# Patient Record
Sex: FEMALE | Race: WHITE | NOT HISPANIC OR LATINO | Employment: FULL TIME | URBAN - METROPOLITAN AREA
[De-identification: names, ages, dates, MRNs, and addresses within clinical notes are randomized per-mention and may not be internally consistent; named-entity substitution may affect disease eponyms.]

---

## 2022-10-18 ENCOUNTER — TELEPHONE (OUTPATIENT)
Dept: ORTHOPEDICS | Facility: CLINIC | Age: 47
End: 2022-10-18
Payer: COMMERCIAL

## 2022-10-18 NOTE — TELEPHONE ENCOUNTER
----- Message from Concha Aaron sent at 10/18/2022  3:38 PM CDT -----  Contact: patient  Type:  Patient Call          Who Called:patient         Does the patient know what this is regarding?: requesting a call back to schedule appt  ; pt was referred by Dr Gonsalez ;please advise           Would the patient rather a call back or a response via [facility]? Call           Best Call Back Number:[phone]             Additional Information:

## 2022-10-19 DIAGNOSIS — M25.562 LEFT KNEE PAIN, UNSPECIFIED CHRONICITY: Primary | ICD-10-CM

## 2022-10-20 ENCOUNTER — OFFICE VISIT (OUTPATIENT)
Dept: ORTHOPEDICS | Facility: CLINIC | Age: 47
End: 2022-10-20
Payer: COMMERCIAL

## 2022-10-20 ENCOUNTER — HOSPITAL ENCOUNTER (OUTPATIENT)
Dept: RADIOLOGY | Facility: HOSPITAL | Age: 47
Discharge: HOME | End: 2022-10-20
Attending: NURSE PRACTITIONER
Payer: COMMERCIAL

## 2022-10-20 DIAGNOSIS — M17.12 PRIMARY OSTEOARTHRITIS OF LEFT KNEE: Primary | ICD-10-CM

## 2022-10-20 DIAGNOSIS — M25.562 LEFT KNEE PAIN, UNSPECIFIED CHRONICITY: ICD-10-CM

## 2022-10-20 PROCEDURE — 73560 X-RAY EXAM OF KNEE 1 OR 2: CPT | Mod: 26,RT,, | Performed by: RADIOLOGY

## 2022-10-20 PROCEDURE — 20610 DRAIN/INJ JOINT/BURSA W/O US: CPT | Mod: LT,S$GLB,, | Performed by: NURSE PRACTITIONER

## 2022-10-20 PROCEDURE — 73560 XR KNEE ORTHO LEFT: ICD-10-PCS | Mod: 26,RT,, | Performed by: RADIOLOGY

## 2022-10-20 PROCEDURE — 4010F PR ACE/ARB THEARPY RXD/TAKEN: ICD-10-PCS | Mod: CPTII,S$GLB,, | Performed by: NURSE PRACTITIONER

## 2022-10-20 PROCEDURE — 4010F ACE/ARB THERAPY RXD/TAKEN: CPT | Mod: CPTII,S$GLB,, | Performed by: NURSE PRACTITIONER

## 2022-10-20 PROCEDURE — 73560 X-RAY EXAM OF KNEE 1 OR 2: CPT | Mod: TC,PO,RT

## 2022-10-20 PROCEDURE — 99999 PR PBB SHADOW E&M-EST. PATIENT-LVL III: CPT | Mod: PBBFAC,,, | Performed by: NURSE PRACTITIONER

## 2022-10-20 PROCEDURE — 73562 X-RAY EXAM OF KNEE 3: CPT | Mod: 26,LT,, | Performed by: RADIOLOGY

## 2022-10-20 PROCEDURE — 1160F PR REVIEW ALL MEDS BY PRESCRIBER/CLIN PHARMACIST DOCUMENTED: ICD-10-PCS | Mod: CPTII,S$GLB,, | Performed by: NURSE PRACTITIONER

## 2022-10-20 PROCEDURE — 20610 LARGE JOINT ASPIRATION/INJECTION: L KNEE: ICD-10-PCS | Mod: LT,S$GLB,, | Performed by: NURSE PRACTITIONER

## 2022-10-20 PROCEDURE — 99203 OFFICE O/P NEW LOW 30 MIN: CPT | Mod: 25,S$GLB,, | Performed by: NURSE PRACTITIONER

## 2022-10-20 PROCEDURE — 99203 PR OFFICE/OUTPT VISIT, NEW, LEVL III, 30-44 MIN: ICD-10-PCS | Mod: 25,S$GLB,, | Performed by: NURSE PRACTITIONER

## 2022-10-20 PROCEDURE — 1159F MED LIST DOCD IN RCRD: CPT | Mod: CPTII,S$GLB,, | Performed by: NURSE PRACTITIONER

## 2022-10-20 PROCEDURE — 1160F RVW MEDS BY RX/DR IN RCRD: CPT | Mod: CPTII,S$GLB,, | Performed by: NURSE PRACTITIONER

## 2022-10-20 PROCEDURE — 1159F PR MEDICATION LIST DOCUMENTED IN MEDICAL RECORD: ICD-10-PCS | Mod: CPTII,S$GLB,, | Performed by: NURSE PRACTITIONER

## 2022-10-20 PROCEDURE — 99999 PR PBB SHADOW E&M-EST. PATIENT-LVL III: ICD-10-PCS | Mod: PBBFAC,,, | Performed by: NURSE PRACTITIONER

## 2022-10-20 PROCEDURE — 73562 XR KNEE ORTHO LEFT: ICD-10-PCS | Mod: 26,LT,, | Performed by: RADIOLOGY

## 2022-10-20 RX ORDER — MONTELUKAST SODIUM 10 MG/1
10 TABLET ORAL DAILY
COMMUNITY
Start: 2022-10-20

## 2022-10-20 RX ORDER — PREDNISONE 20 MG/1
20 TABLET ORAL 2 TIMES DAILY
COMMUNITY
Start: 2022-10-18 | End: 2023-03-17

## 2022-10-20 RX ORDER — CYANOCOBALAMIN 1000 UG/ML
INJECTION, SOLUTION INTRAMUSCULAR; SUBCUTANEOUS
COMMUNITY
Start: 2022-08-26

## 2022-10-20 RX ORDER — BACLOFEN 10 MG/1
10 TABLET ORAL 2 TIMES DAILY
COMMUNITY
Start: 2022-09-30

## 2022-10-20 RX ORDER — METOPROLOL SUCCINATE 25 MG/1
25 TABLET, EXTENDED RELEASE ORAL 2 TIMES DAILY
COMMUNITY
Start: 2022-10-18

## 2022-10-20 RX ORDER — KETOCONAZOLE 20 MG/G
CREAM TOPICAL
COMMUNITY

## 2022-10-20 RX ORDER — DICLOFENAC SODIUM 75 MG/1
75 TABLET, DELAYED RELEASE ORAL 2 TIMES DAILY
COMMUNITY
Start: 2022-09-30

## 2022-10-20 RX ORDER — ACETAZOLAMIDE 250 MG/1
1 TABLET ORAL 2 TIMES DAILY
COMMUNITY
Start: 2022-10-20

## 2022-10-20 RX ORDER — ERGOCALCIFEROL 1.25 MG/1
2000 CAPSULE ORAL
COMMUNITY
End: 2023-03-17

## 2022-10-20 RX ORDER — POTASSIUM CHLORIDE 750 MG/1
10 TABLET, EXTENDED RELEASE ORAL DAILY
COMMUNITY
Start: 2022-09-30

## 2022-10-20 RX ORDER — FOLIC ACID 1 MG/1
1000 TABLET ORAL DAILY
COMMUNITY
Start: 2022-09-30

## 2022-10-20 RX ORDER — ACETAZOLAMIDE 500 MG/1
1000 CAPSULE, EXTENDED RELEASE ORAL
COMMUNITY
Start: 2022-03-22

## 2022-10-20 RX ORDER — ASPIRIN 325 MG
50000 TABLET, DELAYED RELEASE (ENTERIC COATED) ORAL
COMMUNITY
Start: 2022-09-30

## 2022-10-20 RX ORDER — PANTOPRAZOLE SODIUM 40 MG/1
40 TABLET, DELAYED RELEASE ORAL 2 TIMES DAILY
COMMUNITY

## 2022-10-20 RX ORDER — PRAMIPEXOLE DIHYDROCHLORIDE 0.12 MG/1
TABLET ORAL
COMMUNITY
Start: 2022-09-30

## 2022-10-20 RX ORDER — FUROSEMIDE 40 MG/1
40 TABLET ORAL DAILY
COMMUNITY
Start: 2022-10-20

## 2022-10-20 RX ORDER — HYDROCODONE BITARTRATE AND ACETAMINOPHEN 10; 325 MG/1; MG/1
1 TABLET ORAL 4 TIMES DAILY PRN
COMMUNITY
Start: 2022-10-04

## 2022-10-20 RX ORDER — PREGABALIN 75 MG/1
75 CAPSULE ORAL 3 TIMES DAILY
COMMUNITY
Start: 2022-09-30

## 2022-10-20 RX ORDER — DICLOFENAC SODIUM 50 MG/1
50 TABLET, DELAYED RELEASE ORAL 3 TIMES DAILY
COMMUNITY
Start: 2022-10-18 | End: 2023-03-17

## 2022-10-20 RX ADMIN — TRIAMCINOLONE ACETONIDE 40 MG: 40 INJECTION, SUSPENSION INTRA-ARTICULAR; INTRAMUSCULAR at 03:10

## 2022-10-20 NOTE — PROGRESS NOTES
Chief Complaint   Patient presents with    Left Knee - Pain     Hurting last 2 weeks. Monday night 10/17 felt pop in knee.          HPI:   This is a 46 y.o. female who presents to clinic today complaining of left knee pain for   2 weeks after twisting injury while pivoting. She went to ED, states ED physician said it may be her ligament instead of meniscus. Pain has improved some with elevating and icing. No numbness or tingling. She has hx of meniscal tear in her right knee and she had arthroscopy to that knee in 2010. She works at [employer] and has to work tonight and needs something to manage pain.     History reviewed. No pertinent past medical history.  History reviewed. No pertinent surgical history.  Current Outpatient Medications on File Prior to Visit   Medication Sig Dispense Refill    acetaZOLAMIDE (DIAMOX) 250 MG tablet Take 1 tablet by mouth 2 (two) times daily.      acetaZOLAMIDE (DIAMOX) 500 mg CpSR Take 1,000 mg by mouth.      baclofen (LIORESAL) 10 MG tablet Take 10 mg by mouth 2 (two) times daily.      cholecalciferol, vitamin D3, 1,250 mcg (50,000 unit) capsule Take 50,000 Units by mouth every 7 days.      cyanocobalamin 1,000 mcg/mL injection Inject into the muscle.      diclofenac (VOLTAREN) 50 MG EC tablet Take 50 mg by mouth 3 (three) times daily.      diclofenac (VOLTAREN) 75 MG EC tablet Take 75 mg by mouth 2 (two) times daily.      ergocalciferol (ERGOCALCIFEROL) 50,000 unit Cap Take 2,000 Units by mouth.      folic acid (FOLVITE) 1 MG tablet Take 1,000 mcg by mouth once daily.      furosemide (LASIX) 40 MG tablet Take 40 mg by mouth once daily.      HYDROcodone-acetaminophen (NORCO)  mg per tablet Take 1 tablet by mouth 4 (four) times daily as needed.      ketoconazole (NIZORAL) 2 % cream Apply topically as needed.      metoprolol succinate (TOPROL-XL) 25 MG 24 hr tablet Take 25 mg by mouth 2 (two) times daily.      montelukast (SINGULAIR) 10 mg tablet Take 10 mg by mouth once daily.       pantoprazole (PROTONIX) 40 MG tablet Take 40 mg by mouth 2 (two) times daily.      potassium chloride (KLOR-CON) 10 MEQ TbSR Take 10 mEq by mouth once daily.      pramipexole (MIRAPEX) 0.125 MG tablet Take by mouth.      predniSONE (DELTASONE) 20 MG tablet Take 20 mg by mouth 2 (two) times daily.      pregabalin (LYRICA) 75 MG capsule Take 75 mg by mouth 3 (three) times daily.       No current facility-administered medications on file prior to visit.     Review of patient's allergies indicates:  No Known Allergies  History reviewed. No pertinent family history.  Social History     Socioeconomic History    Marital status:    Tobacco Use    Smoking status: Never    Smokeless tobacco: Never       Review of Systems:  Constitutional:  Denies fever or chills   Eyes:  Denies change in visual acuity   HENT:  Denies nasal congestion or sore throat   Respiratory:  Denies cough or shortness of breath   Cardiovascular:  Denies chest pain or edema   GI:  Denies abdominal pain, nausea, vomiting, bloody stools or diarrhea   :  Denies dysuria   Integument:  Denies rash   Neurologic:  Denies headache, focal weakness or sensory changes   Endocrine:  Denies polyuria or polydipsia   Lymphatic:  Denies swollen glands   Psychiatric:  Denies depression or anxiety     Physical Exam:   Constitutional:  Well developed, well nourished, no acute distress, non-toxic appearance   Integument:  Well hydrated  Neurologic:  Alert & oriented x 3  Psychiatric:  Speech and behavior appropriate     Bilateral Knee Exam    Left Knee Exam     Tenderness   The patient is experiencing tenderness in the medial joint line.    Range of Motion   Extension: abnormal   Flexion: abnormal     Muscle Strength     The patient has normal knee strength.    Tests   Evelyn:  Medial - positive   Lachman:  Anterior - negative      Varus: negative  Valgus: negative  Patellar Apprehension: negative    Other   Erythema: absent  Sensation: normal  Pulse:  present  Swelling: mild      Right Knee Exam   Right knee exam performed same as contralateral side and is normal.            X-rays were performed, personally reviewed by me and findings discussed with the patient.  3 views of the left knee show tricompartmental degenerative change most pronounced in the medial compartment with Kellgren 3 changes          Primary osteoarthritis of left knee  -     Large Joint Aspiration/Injection: L knee      Using an aseptic technique, I injected 5 cc of lidocaine 1% without and 1 cc of kenalog 40mg into the left Knee. The patient tolerated this well.     RTC in 3 months or as needed.

## 2022-10-21 RX ORDER — TRIAMCINOLONE ACETONIDE 40 MG/ML
40 INJECTION, SUSPENSION INTRA-ARTICULAR; INTRAMUSCULAR
Status: DISCONTINUED | OUTPATIENT
Start: 2022-10-20 | End: 2022-10-21 | Stop reason: HOSPADM

## 2022-10-21 NOTE — PROCEDURES
Large Joint Aspiration/Injection: L knee    Date/Time: 10/20/2022 3:00 PM  Performed by: RUSSELL Stanley  Authorized by: RUSSELL Stanley     Consent Done?:  Yes (Verbal)  Indications:  Pain  Timeout: prior to procedure the correct patient, procedure, and site was verified    Prep: patient was prepped and draped in usual sterile fashion    Local anesthetic:  Lidocaine 1% without epinephrine  Anesthetic total (ml):  5      Details:  Needle Size:  21 G  Approach:  Anterolateral  Location:  Knee  Site:  L knee  Medications:  40 mg triamcinolone acetonide 40 mg/mL  Patient tolerance:  Patient tolerated the procedure well with no immediate complications

## 2023-03-17 ENCOUNTER — OFFICE VISIT (OUTPATIENT)
Dept: ORTHOPEDICS | Facility: CLINIC | Age: 48
End: 2023-03-17
Payer: COMMERCIAL

## 2023-03-17 VITALS — WEIGHT: 293 LBS | HEIGHT: 66 IN | BODY MASS INDEX: 47.09 KG/M2

## 2023-03-17 DIAGNOSIS — M17.12 PRIMARY OSTEOARTHRITIS OF LEFT KNEE: Primary | ICD-10-CM

## 2023-03-17 DIAGNOSIS — M17.11 PRIMARY OSTEOARTHRITIS OF RIGHT KNEE: ICD-10-CM

## 2023-03-17 PROCEDURE — 99999 PR PBB SHADOW E&M-EST. PATIENT-LVL IV: CPT | Mod: PBBFAC,,, | Performed by: NURSE PRACTITIONER

## 2023-03-17 PROCEDURE — 1159F MED LIST DOCD IN RCRD: CPT | Mod: CPTII,S$GLB,, | Performed by: NURSE PRACTITIONER

## 2023-03-17 PROCEDURE — 20610 DRAIN/INJ JOINT/BURSA W/O US: CPT | Mod: 50,S$GLB,, | Performed by: NURSE PRACTITIONER

## 2023-03-17 PROCEDURE — 3008F BODY MASS INDEX DOCD: CPT | Mod: CPTII,S$GLB,, | Performed by: NURSE PRACTITIONER

## 2023-03-17 PROCEDURE — 99499 UNLISTED E&M SERVICE: CPT | Mod: S$GLB,,, | Performed by: NURSE PRACTITIONER

## 2023-03-17 PROCEDURE — 99499 NO LOS: ICD-10-PCS | Mod: S$GLB,,, | Performed by: NURSE PRACTITIONER

## 2023-03-17 PROCEDURE — 1159F PR MEDICATION LIST DOCUMENTED IN MEDICAL RECORD: ICD-10-PCS | Mod: CPTII,S$GLB,, | Performed by: NURSE PRACTITIONER

## 2023-03-17 PROCEDURE — 1160F RVW MEDS BY RX/DR IN RCRD: CPT | Mod: CPTII,S$GLB,, | Performed by: NURSE PRACTITIONER

## 2023-03-17 PROCEDURE — 20610 LARGE JOINT ASPIRATION/INJECTION: BILATERAL KNEE: ICD-10-PCS | Mod: 50,S$GLB,, | Performed by: NURSE PRACTITIONER

## 2023-03-17 PROCEDURE — 4010F ACE/ARB THERAPY RXD/TAKEN: CPT | Mod: CPTII,S$GLB,, | Performed by: NURSE PRACTITIONER

## 2023-03-17 PROCEDURE — 99999 PR PBB SHADOW E&M-EST. PATIENT-LVL IV: ICD-10-PCS | Mod: PBBFAC,,, | Performed by: NURSE PRACTITIONER

## 2023-03-17 PROCEDURE — 4010F PR ACE/ARB THEARPY RXD/TAKEN: ICD-10-PCS | Mod: CPTII,S$GLB,, | Performed by: NURSE PRACTITIONER

## 2023-03-17 PROCEDURE — 1160F PR REVIEW ALL MEDS BY PRESCRIBER/CLIN PHARMACIST DOCUMENTED: ICD-10-PCS | Mod: CPTII,S$GLB,, | Performed by: NURSE PRACTITIONER

## 2023-03-17 PROCEDURE — 3008F PR BODY MASS INDEX (BMI) DOCUMENTED: ICD-10-PCS | Mod: CPTII,S$GLB,, | Performed by: NURSE PRACTITIONER

## 2023-03-17 RX ORDER — FERROUS SULFATE 324(65)MG
324 TABLET, DELAYED RELEASE (ENTERIC COATED) ORAL
COMMUNITY

## 2023-03-17 RX ORDER — TIRZEPATIDE 10 MG/.5ML
10 INJECTION, SOLUTION SUBCUTANEOUS
COMMUNITY

## 2023-03-17 RX ORDER — LOSARTAN POTASSIUM 100 MG/1
100 TABLET ORAL
COMMUNITY
Start: 2023-03-07

## 2023-03-17 RX ORDER — TRIAMCINOLONE ACETONIDE 40 MG/ML
40 INJECTION, SUSPENSION INTRA-ARTICULAR; INTRAMUSCULAR
Status: DISCONTINUED | OUTPATIENT
Start: 2023-03-17 | End: 2023-03-17 | Stop reason: HOSPADM

## 2023-03-17 RX ORDER — BUDESONIDE AND FORMOTEROL FUMARATE DIHYDRATE 160; 4.5 UG/1; UG/1
AEROSOL RESPIRATORY (INHALATION)
COMMUNITY
Start: 2022-12-02

## 2023-03-17 RX ADMIN — TRIAMCINOLONE ACETONIDE 40 MG: 40 INJECTION, SUSPENSION INTRA-ARTICULAR; INTRAMUSCULAR at 08:03

## 2023-03-17 NOTE — PROCEDURES
Large Joint Aspiration/Injection: bilateral knee    Date/Time: 3/17/2023 8:20 AM  Performed by: RUSSELL Stanley  Authorized by: RUSSELL Stanley     Consent Done?:  Yes (Verbal)  Indications:  Pain  Timeout: prior to procedure the correct patient, procedure, and site was verified    Prep: patient was prepped and draped in usual sterile fashion    Local anesthetic:  Lidocaine 1% without epinephrine  Anesthetic total (ml):  5      Details:  Needle Size:  21 G  Approach:  Anterolateral  Location:  Knee  Laterality:  Bilateral  Site:  Bilateral knee  Medications (Right):  40 mg triamcinolone acetonide 40 mg/mL  Medications (Left):  40 mg triamcinolone acetonide 40 mg/mL  Patient tolerance:  Patient tolerated the procedure well with no immediate complications

## 2023-03-17 NOTE — PROGRESS NOTES
Chief Complaint   Patient presents with    Left Knee - Pain    Right Knee - Pain         HPI:   This is a 47 y.o. female who presents to clinic today complaining of bilateral knee pain for 2 weeks after no known trauma. Pain is progressively worsening. No numbness or tingling. No associated signs or symptoms.    History reviewed. No pertinent past medical history.  Past Surgical History:   Procedure Laterality Date    HYSTERECTOMY      knee scope Right      Current Outpatient Medications on File Prior to Visit   Medication Sig Dispense Refill    acetaZOLAMIDE (DIAMOX) 250 MG tablet Take 1 tablet by mouth 2 (two) times daily.      acetaZOLAMIDE (DIAMOX) 500 mg CpSR Take 1,000 mg by mouth.      baclofen (LIORESAL) 10 MG tablet Take 10 mg by mouth 2 (two) times daily.      cholecalciferol, vitamin D3, 1,250 mcg (50,000 unit) capsule Take 50,000 Units by mouth every 7 days.      cyanocobalamin 1,000 mcg/mL injection Inject into the muscle.      diclofenac (VOLTAREN) 75 MG EC tablet Take 75 mg by mouth 2 (two) times daily.      ferrous sulfate 324 mg (65 mg iron) TbEC Take 324 mg by mouth.      folic acid (FOLVITE) 1 MG tablet Take 1,000 mcg by mouth once daily.      furosemide (LASIX) 40 MG tablet Take 40 mg by mouth once daily.      HYDROcodone-acetaminophen (NORCO)  mg per tablet Take 1 tablet by mouth 4 (four) times daily as needed.      ketoconazole (NIZORAL) 2 % cream Apply topically as needed.      losartan (COZAAR) 100 MG tablet Take 100 mg by mouth.      metoprolol succinate (TOPROL-XL) 25 MG 24 hr tablet Take 25 mg by mouth 2 (two) times daily.      montelukast (SINGULAIR) 10 mg tablet Take 10 mg by mouth once daily.      pantoprazole (PROTONIX) 40 MG tablet Take 40 mg by mouth 2 (two) times daily.      potassium chloride (KLOR-CON) 10 MEQ TbSR Take 10 mEq by mouth once daily.      pramipexole (MIRAPEX) 0.125 MG tablet Take by mouth.      pregabalin (LYRICA) 75 MG capsule Take 75 mg by mouth 3 (three)  times daily.      SYMBICORT 160-4.5 mcg/actuation HFAA Inhale into the lungs.      tirzepatide (MOUNJARO) 10 mg/0.5 mL PnIj Inject 10 mg into the skin every 7 days.      [DISCONTINUED] diclofenac (VOLTAREN) 50 MG EC tablet Take 50 mg by mouth 3 (three) times daily.      [DISCONTINUED] ergocalciferol (ERGOCALCIFEROL) 50,000 unit Cap Take 2,000 Units by mouth.      [DISCONTINUED] predniSONE (DELTASONE) 20 MG tablet Take 20 mg by mouth 2 (two) times daily.       No current facility-administered medications on file prior to visit.     Review of patient's allergies indicates:   Allergen Reactions    Adhesive tape-silicones      Other reaction(s): Other (See Comments)  CANNOT USE PAPER TAPE OR FABRIC BANDAIDS    Codeine Rash     Only in liquid form like cough meds- per pt. Pt can tolerate hydrocodone and oxycodone       Egg Diarrhea    Lactase      Other reaction(s): Other (See Comments)  STOMACH ISSUES AND DIARRHEA AND SOMETIMES VOMITING     History reviewed. No pertinent family history.  Social History     Socioeconomic History    Marital status:    Tobacco Use    Smoking status: Never    Smokeless tobacco: Never   Substance and Sexual Activity    Alcohol use: Never    Drug use: Never       Review of Systems:  Constitutional:  Denies fever or chills   Eyes:  Denies change in visual acuity   HENT:  Denies nasal congestion or sore throat   Respiratory:  Denies cough or shortness of breath   Cardiovascular:  Denies chest pain or edema   GI:  Denies abdominal pain, nausea, vomiting, bloody stools or diarrhea   :  Denies dysuria   Integument:  Denies rash   Neurologic:  Denies headache, focal weakness or sensory changes   Endocrine:  Denies polyuria or polydipsia   Lymphatic:  Denies swollen glands   Psychiatric:  Denies depression or anxiety     Physical Exam:   Constitutional:  Well developed, well nourished, no acute distress, non-toxic appearance   Integument:  Well hydrated  Neurologic:  Alert & oriented x  3  Psychiatric:  Speech and behavior appropriate    Bilateral Knee Exam    Bilateral Knee Exam     Tenderness   The patient is experiencing tenderness in the medial joint line.    Range of Motion   Extension: abnormal   Flexion: abnormal     Muscle Strength     The patient has normal knee strength.    Tests   Evelyn:  Medial - positive   Lachman:  Anterior - negative      Varus: negative  Valgus: negative  Patellar Apprehension: negative    Other   Erythema: absent  Sensation: normal  Pulse: present  Swelling: mild      Bilateral Knee Exam   Bilateral knee exam performed same as contralateral side and is normal.              Primary osteoarthritis of left knee  -     Large Joint Aspiration/Injection: bilateral knee    Primary osteoarthritis of right knee  -     Large Joint Aspiration/Injection: bilateral knee          Using an aseptic technique, I injected 5 cc of lidocaine 1% without and 1 cc of kenalog 40mg into the bilateral Knee. The patient tolerated this well.     Rtc in 3 months.

## 2023-05-29 ENCOUNTER — TELEPHONE (OUTPATIENT)
Dept: ORTHOPEDICS | Facility: CLINIC | Age: 48
End: 2023-05-29
Payer: COMMERCIAL

## 2023-05-30 ENCOUNTER — TELEPHONE (OUTPATIENT)
Dept: ORTHOPEDICS | Facility: CLINIC | Age: 48
End: 2023-05-30
Payer: COMMERCIAL

## 2023-05-31 ENCOUNTER — TELEPHONE (OUTPATIENT)
Dept: ORTHOPEDICS | Facility: CLINIC | Age: 48
End: 2023-05-31
Payer: COMMERCIAL

## 2023-05-31 NOTE — TELEPHONE ENCOUNTER
3rd attempt, let pt know we were cxing her appt on 6/20/23 at 9:20am due to provider being out of office/, please call office for rescheduling, if needed/jf 5/31/23 *LM to reschedule/jf 5/30/23* *LM to reschedule/jf 5/29/23* bilateral knee 3MO

## 2023-10-03 ENCOUNTER — OFFICE VISIT (OUTPATIENT)
Dept: ORTHOPEDICS | Facility: CLINIC | Age: 48
End: 2023-10-03
Payer: COMMERCIAL

## 2023-10-03 DIAGNOSIS — M17.11 PRIMARY OSTEOARTHRITIS OF RIGHT KNEE: ICD-10-CM

## 2023-10-03 DIAGNOSIS — M17.12 PRIMARY OSTEOARTHRITIS OF LEFT KNEE: Primary | ICD-10-CM

## 2023-10-03 PROCEDURE — 99999 PR PBB SHADOW E&M-EST. PATIENT-LVL III: CPT | Mod: PBBFAC,,, | Performed by: NURSE PRACTITIONER

## 2023-10-03 PROCEDURE — 20610 LARGE JOINT ASPIRATION/INJECTION: BILATERAL KNEE: ICD-10-PCS | Mod: 50,S$GLB,, | Performed by: NURSE PRACTITIONER

## 2023-10-03 PROCEDURE — 3044F PR MOST RECENT HEMOGLOBIN A1C LEVEL <7.0%: ICD-10-PCS | Mod: CPTII,S$GLB,, | Performed by: NURSE PRACTITIONER

## 2023-10-03 PROCEDURE — 3044F HG A1C LEVEL LT 7.0%: CPT | Mod: CPTII,S$GLB,, | Performed by: NURSE PRACTITIONER

## 2023-10-03 PROCEDURE — 99212 PR OFFICE/OUTPT VISIT, EST, LEVL II, 10-19 MIN: ICD-10-PCS | Mod: 25,S$GLB,, | Performed by: NURSE PRACTITIONER

## 2023-10-03 PROCEDURE — 4010F PR ACE/ARB THEARPY RXD/TAKEN: ICD-10-PCS | Mod: CPTII,S$GLB,, | Performed by: NURSE PRACTITIONER

## 2023-10-03 PROCEDURE — 1159F MED LIST DOCD IN RCRD: CPT | Mod: CPTII,S$GLB,, | Performed by: NURSE PRACTITIONER

## 2023-10-03 PROCEDURE — 4010F ACE/ARB THERAPY RXD/TAKEN: CPT | Mod: CPTII,S$GLB,, | Performed by: NURSE PRACTITIONER

## 2023-10-03 PROCEDURE — 20610 DRAIN/INJ JOINT/BURSA W/O US: CPT | Mod: 50,S$GLB,, | Performed by: NURSE PRACTITIONER

## 2023-10-03 PROCEDURE — 1159F PR MEDICATION LIST DOCUMENTED IN MEDICAL RECORD: ICD-10-PCS | Mod: CPTII,S$GLB,, | Performed by: NURSE PRACTITIONER

## 2023-10-03 PROCEDURE — 99212 OFFICE O/P EST SF 10 MIN: CPT | Mod: 25,S$GLB,, | Performed by: NURSE PRACTITIONER

## 2023-10-03 PROCEDURE — 1160F PR REVIEW ALL MEDS BY PRESCRIBER/CLIN PHARMACIST DOCUMENTED: ICD-10-PCS | Mod: CPTII,S$GLB,, | Performed by: NURSE PRACTITIONER

## 2023-10-03 PROCEDURE — 99999 PR PBB SHADOW E&M-EST. PATIENT-LVL III: ICD-10-PCS | Mod: PBBFAC,,, | Performed by: NURSE PRACTITIONER

## 2023-10-03 PROCEDURE — 1160F RVW MEDS BY RX/DR IN RCRD: CPT | Mod: CPTII,S$GLB,, | Performed by: NURSE PRACTITIONER

## 2023-10-03 RX ADMIN — TRIAMCINOLONE ACETONIDE 40 MG: 40 INJECTION, SUSPENSION INTRA-ARTICULAR; INTRAMUSCULAR at 03:10

## 2023-10-03 NOTE — LETTER
October 3, 2023    Valery Akins  [address] [facility] [facility] [place] [address]         [facility]  [address] [place] [address]  Phone: [phone] October 3, 2023     Patient: Valery Akins   YOB: 1975   Date of Visit: 10/3/2023       To Whom It May Concern:    It is my medical opinion that Valery Akins should remain out of work until 10/10/2023 .    If you have any questions or concerns, please don't hesitate to call.    Sincerely,        Mia Escobar FNP

## 2023-10-04 RX ORDER — TRIAMCINOLONE ACETONIDE 40 MG/ML
40 INJECTION, SUSPENSION INTRA-ARTICULAR; INTRAMUSCULAR
Status: DISCONTINUED | OUTPATIENT
Start: 2023-10-03 | End: 2023-10-04 | Stop reason: HOSPADM

## 2023-10-04 NOTE — PROGRESS NOTES
Chief Complaint   Patient presents with    Right Knee - Pain    Left Knee - Pain         HPI:   This is a 47 y.o. who presents to clinic today complaining of bilateral knee pain for 2-3 weeks after no known trauma. 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 Pain is progressively worsening. No numbness or tingling. No associated signs or symptoms.    History reviewed. No pertinent past medical history.  Past Surgical History:   Procedure Laterality Date    HYSTERECTOMY      knee scope Right      Current Outpatient Medications on File Prior to Visit   Medication Sig Dispense Refill    acetaZOLAMIDE (DIAMOX) 250 MG tablet Take 1 tablet by mouth 2 (two) times daily.      acetaZOLAMIDE (DIAMOX) 500 mg CpSR Take 1,000 mg by mouth.      baclofen (LIORESAL) 10 MG tablet Take 10 mg by mouth 2 (two) times daily.      cholecalciferol, vitamin D3, 1,250 mcg (50,000 unit) capsule Take 50,000 Units by mouth every 7 days.      cyanocobalamin 1,000 mcg/mL injection Inject into the muscle.      diclofenac (VOLTAREN) 75 MG EC tablet Take 75 mg by mouth 2 (two) times daily.      ferrous sulfate 324 mg (65 mg iron) TbEC Take 324 mg by mouth.      folic acid (FOLVITE) 1 MG tablet Take 1,000 mcg by mouth once daily.      furosemide (LASIX) 40 MG tablet Take 40 mg by mouth once daily.      HYDROcodone-acetaminophen (NORCO)  mg per tablet Take 1 tablet by mouth 4 (four) times daily as needed.      ketoconazole (NIZORAL) 2 % cream Apply topically as needed.      losartan (COZAAR) 100 MG tablet Take 100 mg by mouth.      metoprolol succinate (TOPROL-XL) 25 MG 24 hr tablet Take 25 mg by mouth 2 (two) times daily.      montelukast (SINGULAIR) 10 mg tablet Take 10 mg by mouth once daily.      pantoprazole (PROTONIX) 40 MG tablet Take 40 mg by mouth 2 (two) times daily.      potassium chloride (KLOR-CON) 10 MEQ TbSR Take 10 mEq by mouth once daily.      pramipexole (MIRAPEX)  0.125 MG tablet Take by mouth.      pregabalin (LYRICA) 75 MG capsule Take 75 mg by mouth 3 (three) times daily.      SYMBICORT 160-4.5 mcg/actuation HFAA Inhale into the lungs.      tirzepatide (MOUNJARO) 10 mg/0.5 mL PnIj Inject 10 mg into the skin every 7 days.       No current facility-administered medications on file prior to visit.     Review of patient's allergies indicates:   Allergen Reactions    Adhesive tape-silicones      Other reaction(s): Other (See Comments)  CANNOT USE PAPER TAPE OR FABRIC BANDAIDS    Codeine Rash     Only in liquid form like cough meds- per pt. Pt can tolerate hydrocodone and oxycodone       Egg Diarrhea    Lactase      Other reaction(s): Other (See Comments)  STOMACH ISSUES AND DIARRHEA AND SOMETIMES VOMITING     History reviewed. No pertinent family history.  Social History     Socioeconomic History    Marital status:    Tobacco Use    Smoking status: Never    Smokeless tobacco: Never   Substance and Sexual Activity    Alcohol use: Never    Drug use: Never       Review of Systems:  Constitutional:  Denies fever or chills   Eyes:  Denies change in visual acuity   HENT:  Denies nasal congestion or sore throat   Respiratory:  Denies cough or shortness of breath   Cardiovascular:  Denies chest pain or edema   GI:  Denies abdominal pain, nausea, vomiting, bloody stools or diarrhea   :  Denies dysuria   Integument:  Denies rash   Neurologic:  Denies headache, focal weakness or sensory changes   Endocrine:  Denies polyuria or polydipsia   Lymphatic:  Denies swollen glands   Psychiatric:  Denies depression or anxiety     Physical Exam:   Constitutional:  Well developed, well nourished, no acute distress, non-toxic appearance   Integument:  Well hydrated, no rash   Lymphatic:  No lymphadenopathy noted   Neurologic:  Alert & oriented x 3, CN 2-12 normal, normal motor function, normal sensory function, no focal deficits noted   Psychiatric:  Speech and behavior appropriate   Eyes:  EOMI  Gi: abdomen soft    Bilateral Knee Exam    Bilateral Knee Exam     Tenderness   The patient is experiencing tenderness in the medial joint line.    Range of Motion   Extension: abnormal   Flexion: abnormal     Muscle Strength     The patient has normal knee strength.    Tests   Evelyn:  Medial - positive   Lachman:  Anterior - negative      Varus: negative  Valgus: negative  Patellar Apprehension: negative    Other   Erythema: absent  Sensation: normal  Pulse: present  Swelling: mild to moderate      Bilateral Knee Exam   Bilateral knee exam performed same as contralateral side and is normal.          Primary osteoarthritis of left knee  -     Large Joint Aspiration/Injection: bilateral knee  -     triamcinolone acetonide injection 40 mg  -     triamcinolone acetonide injection 40 mg    Primary osteoarthritis of right knee  -     Large Joint Aspiration/Injection: bilateral knee  -     triamcinolone acetonide injection 40 mg  -     triamcinolone acetonide injection 40 mg          Using an aseptic technique, I injected 5 cc of lidocaine 1% without and 1 cc of kenalog 40mg into the bilateral Knee. The patient tolerated this well.     Rtc as needed.

## 2023-10-04 NOTE — PROCEDURES
Large Joint Aspiration/Injection: bilateral knee    Date/Time: 10/3/2023 3:20 PM    Performed by: Mia Escobar FNP  Authorized by: Mia Escobar FNP    Consent Done?:  Yes (Verbal)  Indications:  Pain  Timeout: prior to procedure the correct patient, procedure, and site was verified    Prep: patient was prepped and draped in usual sterile fashion    Local anesthetic:  Lidocaine 1% without epinephrine  Anesthetic total (ml):  5      Details:  Needle Size:  21 G  Approach:  Anterolateral  Location:  Knee  Laterality:  Bilateral  Site:  Bilateral knee  Medications (Right):  40 mg triamcinolone acetonide 40 mg/mL  Medications (Left):  40 mg triamcinolone acetonide 40 mg/mL  Patient tolerance:  Patient tolerated the procedure well with no immediate complications

## 2023-10-06 ENCOUNTER — TELEPHONE (OUTPATIENT)
Dept: ORTHOPEDICS | Facility: CLINIC | Age: 48
End: 2023-10-06
Payer: COMMERCIAL

## 2023-10-06 DIAGNOSIS — M17.12 PRIMARY OSTEOARTHRITIS OF LEFT KNEE: Primary | ICD-10-CM

## 2023-10-06 DIAGNOSIS — M17.11 PRIMARY OSTEOARTHRITIS OF RIGHT KNEE: ICD-10-CM

## 2023-10-06 RX ORDER — METHOCARBAMOL 750 MG/1
750 TABLET, FILM COATED ORAL 3 TIMES DAILY PRN
Qty: 90 TABLET | Refills: 0 | Status: SHIPPED | OUTPATIENT
Start: 2023-10-06 | End: 2023-11-05

## 2023-10-06 RX ORDER — METHOCARBAMOL 750 MG/1
1500 TABLET, FILM COATED ORAL 3 TIMES DAILY PRN
Qty: 80 TABLET | Refills: 2 | Status: SHIPPED | OUTPATIENT
Start: 2023-10-06 | End: 2023-10-06

## 2023-10-06 NOTE — TELEPHONE ENCOUNTER
Pt states a muscle relaxer was supposed to be called in.   FYI from pt, she takes baclofen and diclofenac.

## 2023-10-06 NOTE — TELEPHONE ENCOUNTER
----- Message from Unruly Sanderson sent at 10/6/2023  2:59 PM CDT -----  Regarding: checking on Rx that was to be called it yesterday, call pt   Contact: pt   checking on Rx that was to be called it yesterday, call pt

## 2024-01-26 ENCOUNTER — OFFICE VISIT (OUTPATIENT)
Dept: ORTHOPEDICS | Facility: CLINIC | Age: 49
End: 2024-01-26
Payer: COMMERCIAL

## 2024-01-26 VITALS — BODY MASS INDEX: 47.09 KG/M2 | WEIGHT: 293 LBS | HEIGHT: 66 IN

## 2024-01-26 DIAGNOSIS — M17.0 BILATERAL PRIMARY OSTEOARTHRITIS OF KNEE: Primary | ICD-10-CM

## 2024-01-26 PROCEDURE — 1160F RVW MEDS BY RX/DR IN RCRD: CPT | Mod: CPTII,S$GLB,, | Performed by: NURSE PRACTITIONER

## 2024-01-26 PROCEDURE — 3008F BODY MASS INDEX DOCD: CPT | Mod: CPTII,S$GLB,, | Performed by: NURSE PRACTITIONER

## 2024-01-26 PROCEDURE — 99999 PR PBB SHADOW E&M-EST. PATIENT-LVL IV: CPT | Mod: PBBFAC,,, | Performed by: NURSE PRACTITIONER

## 2024-01-26 PROCEDURE — 20610 DRAIN/INJ JOINT/BURSA W/O US: CPT | Mod: 50,S$GLB,, | Performed by: NURSE PRACTITIONER

## 2024-01-26 PROCEDURE — 99214 OFFICE O/P EST MOD 30 MIN: CPT | Mod: 25,S$GLB,, | Performed by: NURSE PRACTITIONER

## 2024-01-26 PROCEDURE — 1159F MED LIST DOCD IN RCRD: CPT | Mod: CPTII,S$GLB,, | Performed by: NURSE PRACTITIONER

## 2024-01-26 RX ORDER — TRIAMCINOLONE ACETONIDE 40 MG/ML
40 INJECTION, SUSPENSION INTRA-ARTICULAR; INTRAMUSCULAR
Status: DISCONTINUED | OUTPATIENT
Start: 2024-01-26 | End: 2024-01-26 | Stop reason: HOSPADM

## 2024-01-26 RX ADMIN — TRIAMCINOLONE ACETONIDE 40 MG: 40 INJECTION, SUSPENSION INTRA-ARTICULAR; INTRAMUSCULAR at 11:01

## 2024-01-26 NOTE — PROGRESS NOTES
Chief Complaint   Patient presents with    Right Knee - Pain    Left Knee - Pain         HPI:   This is a 48 y.o. who presents to clinic today complaining of bilateral knee pain for 1 month after no known trauma. Pain is progressively worsening. No numbness or tingling. No associated signs or symptoms.    History reviewed. No pertinent past medical history.  Past Surgical History:   Procedure Laterality Date    HYSTERECTOMY      knee scope Right      Current Outpatient Medications on File Prior to Visit   Medication Sig Dispense Refill    acetaZOLAMIDE (DIAMOX) 250 MG tablet Take 1 tablet by mouth 2 (two) times daily.      acetaZOLAMIDE (DIAMOX) 500 mg CpSR Take 1,000 mg by mouth.      baclofen (LIORESAL) 10 MG tablet Take 10 mg by mouth 2 (two) times daily.      cholecalciferol, vitamin D3, 1,250 mcg (50,000 unit) capsule Take 50,000 Units by mouth every 7 days.      cyanocobalamin 1,000 mcg/mL injection Inject into the muscle.      diclofenac (VOLTAREN) 75 MG EC tablet Take 75 mg by mouth 2 (two) times daily.      ferrous sulfate 324 mg (65 mg iron) TbEC Take 324 mg by mouth.      folic acid (FOLVITE) 1 MG tablet Take 1,000 mcg by mouth once daily.      furosemide (LASIX) 40 MG tablet Take 40 mg by mouth once daily.      HYDROcodone-acetaminophen (NORCO)  mg per tablet Take 1 tablet by mouth 4 (four) times daily as needed.      ketoconazole (NIZORAL) 2 % cream Apply topically as needed.      losartan (COZAAR) 100 MG tablet Take 100 mg by mouth.      metoprolol succinate (TOPROL-XL) 25 MG 24 hr tablet Take 25 mg by mouth 2 (two) times daily.      montelukast (SINGULAIR) 10 mg tablet Take 10 mg by mouth once daily.      pantoprazole (PROTONIX) 40 MG tablet Take 40 mg by mouth 2 (two) times daily.      potassium chloride (KLOR-CON) 10 MEQ TbSR Take 10 mEq by mouth once daily.      pramipexole (MIRAPEX) 0.125 MG tablet Take by mouth.      pregabalin (LYRICA) 75 MG capsule Take 75 mg by mouth 3 (three) times  daily.      SYMBICORT 160-4.5 mcg/actuation HFAA Inhale into the lungs.      tirzepatide (MOUNJARO) 10 mg/0.5 mL PnIj Inject 10 mg into the skin every 7 days.       No current facility-administered medications on file prior to visit.     Review of patient's allergies indicates:   Allergen Reactions    Adhesive tape-silicones      Other reaction(s): Other (See Comments)  CANNOT USE PAPER TAPE OR FABRIC BANDAIDS    Codeine Rash     Only in liquid form like cough meds- per pt. Pt can tolerate hydrocodone and oxycodone       Egg Diarrhea    Lactase      Other reaction(s): Other (See Comments)  STOMACH ISSUES AND DIARRHEA AND SOMETIMES VOMITING     History reviewed. No pertinent family history.  Social History     Socioeconomic History    Marital status:    Tobacco Use    Smoking status: Never    Smokeless tobacco: Never   Substance and Sexual Activity    Alcohol use: Never    Drug use: Never       Review of Systems:  Constitutional:  Denies fever or chills   Eyes:  Denies change in visual acuity   HENT:  Denies nasal congestion or sore throat   Respiratory:  Denies cough or shortness of breath   Cardiovascular:  Denies chest pain or edema   GI:  Denies abdominal pain, nausea, vomiting, bloody stools or diarrhea   :  Denies dysuria   Integument:  Denies rash   Neurologic:  Denies headache, focal weakness or sensory changes   Endocrine:  Denies polyuria or polydipsia   Lymphatic:  Denies swollen glands   Psychiatric:  Denies depression or anxiety     Physical Exam:   Constitutional:  Well developed, well nourished, no acute distress, non-toxic appearance   Integument:  Well hydrated, no rash   Lymphatic:  No lymphadenopathy noted   Neurologic:  Alert & oriented x 3, CN 2-12 normal, normal motor function, normal sensory function, no focal deficits noted   Psychiatric:  Speech and behavior appropriate   Eyes: EOMI  Gi: abdomen soft    Bilateral Knee Exam    Bilateral Knee Exam     Tenderness   The patient is  experiencing tenderness in the medial joint line.    Range of Motion   Extension: abnormal   Flexion: abnormal     Muscle Strength     The patient has normal knee strength.    Tests   Evelyn:  Medial - positive   Lachman:  Anterior - negative      Varus: negative  Valgus: negative  Patellar Apprehension: negative    Other   Erythema: absent  Sensation: normal  Pulse: present  Swelling: mild              Assessment & plan:    Bilateral primary osteoarthritis of knee  -     Large Joint Aspiration/Injection: bilateral knee  -     triamcinolone acetonide injection 40 mg  -     triamcinolone acetonide injection 40 mg        Instructed the patient to rest and elevate the affected extremity. He/she can apply cold compresses intermittently as needed.  If warm compresses work better per the patient, can use warm and cool compresses intermittently. As pain improves, patient may then begin normal activities slowly as tolerated.  Call if symptoms persist despite doing instructed directions as above.      Using an aseptic technique, I injected 5 cc of lidocaine 1% without and 1 cc of kenalog 40mg into the bilateral knees. The patient tolerated this well. I will have them return to clinic in 3 months or as needed.    Instructed patient to avoid strenuous activities or activities that he/she is unable to perform because of knee pain for the first day of injection to prevent damage in the knee joint as lidocaine is used and pain will not be present due to numbing analgesic. Steroid medication takes 3 to 5 days to start working. Give IA steroid injection 1-2 weeks to know how much relief they will get. If no improvement in 2 weeks, instructed them to notify me in clinic via phone or mychart.

## 2024-01-26 NOTE — PROCEDURES
Large Joint Aspiration/Injection: bilateral knee    Date/Time: 1/26/2024 11:20 AM    Performed by: Mia Escobar FNP  Authorized by: Mia Escobar FNP    Consent Done?:  Yes (Verbal)  Indications:  Pain  Timeout: prior to procedure the correct patient, procedure, and site was verified    Prep: patient was prepped and draped in usual sterile fashion    Local anesthetic:  Lidocaine 1% without epinephrine  Anesthetic total (ml):  5      Details:  Needle Size:  21 G  Approach:  Anterolateral  Location:  Knee  Laterality:  Bilateral  Site:  Bilateral knee  Medications (Right):  40 mg triamcinolone acetonide 40 mg/mL  Medications (Left):  40 mg triamcinolone acetonide 40 mg/mL  Patient tolerance:  Patient tolerated the procedure well with no immediate complications

## 2024-04-26 ENCOUNTER — OFFICE VISIT (OUTPATIENT)
Dept: ORTHOPEDICS | Facility: CLINIC | Age: 49
End: 2024-04-26
Payer: COMMERCIAL

## 2024-04-26 DIAGNOSIS — M70.51 PES ANSERINUS BURSITIS OF BOTH KNEES: ICD-10-CM

## 2024-04-26 DIAGNOSIS — M70.52 PES ANSERINUS BURSITIS OF BOTH KNEES: ICD-10-CM

## 2024-04-26 DIAGNOSIS — M17.0 BILATERAL PRIMARY OSTEOARTHRITIS OF KNEE: Primary | ICD-10-CM

## 2024-04-26 PROCEDURE — 99999 PR PBB SHADOW E&M-EST. PATIENT-LVL III: CPT | Mod: PBBFAC,,, | Performed by: NURSE PRACTITIONER

## 2024-04-26 PROCEDURE — 4010F ACE/ARB THERAPY RXD/TAKEN: CPT | Mod: CPTII,S$GLB,, | Performed by: NURSE PRACTITIONER

## 2024-04-26 PROCEDURE — 1160F RVW MEDS BY RX/DR IN RCRD: CPT | Mod: CPTII,S$GLB,, | Performed by: NURSE PRACTITIONER

## 2024-04-26 PROCEDURE — 20610 DRAIN/INJ JOINT/BURSA W/O US: CPT | Mod: 50,S$GLB,, | Performed by: NURSE PRACTITIONER

## 2024-04-26 PROCEDURE — 99213 OFFICE O/P EST LOW 20 MIN: CPT | Mod: 25,S$GLB,, | Performed by: NURSE PRACTITIONER

## 2024-04-26 PROCEDURE — 1159F MED LIST DOCD IN RCRD: CPT | Mod: CPTII,S$GLB,, | Performed by: NURSE PRACTITIONER

## 2024-04-26 RX ORDER — TRIAMCINOLONE ACETONIDE 40 MG/ML
40 INJECTION, SUSPENSION INTRA-ARTICULAR; INTRAMUSCULAR
Status: DISCONTINUED | OUTPATIENT
Start: 2024-04-26 | End: 2024-04-26 | Stop reason: HOSPADM

## 2024-04-26 RX ADMIN — TRIAMCINOLONE ACETONIDE 40 MG: 40 INJECTION, SUSPENSION INTRA-ARTICULAR; INTRAMUSCULAR at 10:04

## 2024-04-26 NOTE — PROCEDURES
Large Joint Aspiration/Injection: bilateral knee    Date/Time: 4/26/2024 10:20 AM    Performed by: Mia Escobar FNP  Authorized by: Mia Escobar FNP    Consent Done?:  Yes (Verbal)  Indications:  Pain  Timeout: prior to procedure the correct patient, procedure, and site was verified    Prep: patient was prepped and draped in usual sterile fashion    Local anesthetic:  Lidocaine 1% without epinephrine  Anesthetic total (ml):  5      Details:  Needle Size:  21 G  Approach:  Anterolateral  Location:  Knee  Laterality:  Bilateral  Site:  Bilateral knee  Medications (Right):  40 mg triamcinolone acetonide 40 mg/mL  Medications (Left):  40 mg triamcinolone acetonide 40 mg/mL  Patient tolerance:  Patient tolerated the procedure well with no immediate complications

## 2024-04-26 NOTE — PROGRESS NOTES
Chief Complaint   Patient presents with    Left Knee - Pain    Right Knee - Pain         HPI:   This is a 48 y.o. who presents to clinic today complaining of bilateral knee pain for 2 weeks after no known trauma. Pain is progressively worsening. No numbness or tingling. No associated signs or symptoms.    No past medical history on file.  Past Surgical History:   Procedure Laterality Date    HYSTERECTOMY      knee scope Right      Current Outpatient Medications on File Prior to Visit   Medication Sig Dispense Refill    acetaZOLAMIDE (DIAMOX) 250 MG tablet Take 1 tablet by mouth 2 (two) times daily.      acetaZOLAMIDE (DIAMOX) 500 mg CpSR Take 1,000 mg by mouth.      baclofen (LIORESAL) 10 MG tablet Take 10 mg by mouth 2 (two) times daily.      cholecalciferol, vitamin D3, 1,250 mcg (50,000 unit) capsule Take 50,000 Units by mouth every 7 days.      cyanocobalamin 1,000 mcg/mL injection Inject into the muscle.      diclofenac (VOLTAREN) 75 MG EC tablet Take 75 mg by mouth 2 (two) times daily.      ferrous sulfate 324 mg (65 mg iron) TbEC Take 324 mg by mouth.      folic acid (FOLVITE) 1 MG tablet Take 1,000 mcg by mouth once daily.      furosemide (LASIX) 40 MG tablet Take 40 mg by mouth once daily.      HYDROcodone-acetaminophen (NORCO)  mg per tablet Take 1 tablet by mouth 4 (four) times daily as needed.      ketoconazole (NIZORAL) 2 % cream Apply topically as needed.      losartan (COZAAR) 100 MG tablet Take 100 mg by mouth.      metoprolol succinate (TOPROL-XL) 25 MG 24 hr tablet Take 25 mg by mouth 2 (two) times daily.      montelukast (SINGULAIR) 10 mg tablet Take 10 mg by mouth once daily.      pantoprazole (PROTONIX) 40 MG tablet Take 40 mg by mouth 2 (two) times daily.      potassium chloride (KLOR-CON) 10 MEQ TbSR Take 10 mEq by mouth once daily.      pramipexole (MIRAPEX) 0.125 MG tablet Take by mouth.      pregabalin (LYRICA) 75 MG capsule Take 75 mg by mouth 3 (three) times daily.      SYMBICORT  160-4.5 mcg/actuation HFAA Inhale into the lungs.      tirzepatide (MOUNJARO) 10 mg/0.5 mL PnIj Inject 10 mg into the skin every 7 days.       No current facility-administered medications on file prior to visit.     Review of patient's allergies indicates:   Allergen Reactions    Adhesive tape-silicones      Other reaction(s): Other (See Comments)  CANNOT USE PAPER TAPE OR FABRIC BANDAIDS    Codeine Rash     Only in liquid form like cough meds- per pt. Pt can tolerate hydrocodone and oxycodone       Egg Diarrhea    Lactase      Other reaction(s): Other (See Comments)  STOMACH ISSUES AND DIARRHEA AND SOMETIMES VOMITING     No family history on file.  Social History     Socioeconomic History    Marital status:    Tobacco Use    Smoking status: Never    Smokeless tobacco: Never   Substance and Sexual Activity    Alcohol use: Never    Drug use: Never     Social Determinants of Health     Financial Resource Strain: Unknown (10/5/2021)    Received from [facility]    Overall Financial Resource Strain (CARDIA)     Difficulty of Paying Living Expenses: Patient declined   Food Insecurity: Unknown (10/5/2021)    Received from [facility]    Hunger Vital Sign     Worried About Running Out of Food in the Last Year: Patient declined     Ran Out of Food in the Last Year: Patient declined   Transportation Needs: Unknown (10/5/2021)    Received from [facility]    PRAPARE - Transportation     Lack of Transportation (Medical): Patient declined     Lack of Transportation (Non-Medical): Patient declined   Physical Activity: Unknown (10/5/2021)    Received from [facility]    Exercise Vital Sign     Days of Exercise per Week: Patient declined     Minutes of Exercise per Session: Patient declined   Stress: Unknown (10/5/2021)    Received from [facility]    [employer] [place] [employer] - Occupational Stress Questionnaire     Feeling of Stress : Patient  declined   Social Connections: Unknown (10/5/2021)    Received from [facility]    Social Connection and Isolation Panel [NHANES]     Frequency of Communication with Friends and Family: Patient declined     Frequency of Social Gatherings with Friends and Family: Patient declined     Attends [employer] Services: Patient declined     Active Member of Clubs or Organizations: Patient declined     Attends Club or Organization Meetings: Patient declined     Marital Status: Patient declined       Review of Systems:  Constitutional:  Denies fever or chills   Eyes:  Denies change in visual acuity   HENT:  Denies nasal congestion or sore throat   Respiratory:  Denies cough or shortness of breath   Cardiovascular:  Denies chest pain or edema   GI:  Denies abdominal pain, nausea, vomiting, bloody stools or diarrhea   :  Denies dysuria   Integument:  Denies rash   Neurologic:  Denies headache, focal weakness or sensory changes   Endocrine:  Denies polyuria or polydipsia   Lymphatic:  Denies swollen glands   Psychiatric:  Denies depression or anxiety     Physical Exam:   Constitutional:  Well developed, well nourished, no acute distress, non-toxic appearance   Integument:  Well hydrated  Neurologic:  Alert & oriented x 3  Psychiatric:  Speech and behavior appropriate   Bilateral Knee Exam    Bilateral Knee Exam     Tenderness   The patient is experiencing tenderness in the medial joint line and +TTP to pes bursa bilaterally.     Range of Motion   Extension: abnormal   Flexion: abnormal     Muscle Strength     The patient has normal knee strength.    Tests   Evelyn:  Medial - positive   Lachman:  Anterior - negative      Varus: negative  Valgus: negative  Patellar Apprehension: negative    Other   Erythema: absent  Sensation: normal  Pulse: present  Swelling: mild        Assessment & plan    Bilateral primary osteoarthritis of knee  -     Large Joint Aspiration/Injection: bilateral knee  -     triamcinolone  acetonide injection 40 mg  -     triamcinolone acetonide injection 40 mg  -     Prior authorization Order    Pes anserinus bursitis of both knees  -     Large Joint Aspiration/Injection: L anserine bursa  -     Large Joint Aspiration/Injection: R anserine bursa  -     triamcinolone acetonide injection 40 mg  -     triamcinolone acetonide injection 40 mg    The patient has tried a self guided exercise program that has included walking without significant improvement. Minimal relief with tylenol or OTC Nsaids x 3 months Reports less than 4-6 weeks relief with IA steroid injection. Kellgren Vu scale of 3. They are not receiving another HA injectable at this time. I will precert for gel injection.     Using an aseptic technique, I injected 5 cc of lidocaine 1% without and 1 cc of kenalog 40mg into the bilateral Knee. The patient tolerated this well.   Using an aseptic technique, I injected 1 cc of lidocaine 1% without and 1 cc of kenalog 40mg into the bilateral pes bursa. The patient tolerated this well.   Rtc in 1 month for bilateral durolane gel injections.

## 2024-04-26 NOTE — PROCEDURES
Large Joint Aspiration/Injection: L anserine bursa    Date/Time: 4/26/2024 10:20 AM    Performed by: Mia Escobar FNP  Authorized by: Mia Escobar FNP    Consent Done?:  Yes (Verbal)  Indications:  Pain  Timeout: prior to procedure the correct patient, procedure, and site was verified    Prep: patient was prepped and draped in usual sterile fashion    Local anesthetic:  Lidocaine 1% without epinephrine  Anesthetic total (ml):  1      Details:  Needle Size:  21 G  Approach:  Anterolateral  Location:  Knee  Site:  L anserine bursa  Medications:  40 mg triamcinolone acetonide 40 mg/mL  Patient tolerance:  Patient tolerated the procedure well with no immediate complications  Large Joint Aspiration/Injection: R anserine bursa    Date/Time: 4/26/2024 10:20 AM    Performed by: Mia Escobar FNP  Authorized by: Mia Escobar FNP    Consent Done?:  Yes (Verbal)  Indications:  Pain  Timeout: prior to procedure the correct patient, procedure, and site was verified    Prep: patient was prepped and draped in usual sterile fashion    Local anesthetic:  Lidocaine 1% without epinephrine  Anesthetic total (ml):  1      Details:  Needle Size:  21 G  Approach:  Anterolateral  Location:  Knee  Site:  R anserine bursa  Medications:  40 mg triamcinolone acetonide 40 mg/mL  Patient tolerance:  Patient tolerated the procedure well with no immediate complications

## 2024-05-22 ENCOUNTER — TELEPHONE (OUTPATIENT)
Dept: ORTHOPEDICS | Facility: CLINIC | Age: 49
End: 2024-05-22
Payer: COMMERCIAL

## 2024-05-22 NOTE — TELEPHONE ENCOUNTER
----- Message from Mo Jimenez sent at 5/22/2024 10:46 AM CDT -----  Patient would like a callback regarding the authorization her her gel injections on 05/28/24    [phone]

## 2024-05-28 ENCOUNTER — OFFICE VISIT (OUTPATIENT)
Dept: ORTHOPEDICS | Facility: CLINIC | Age: 49
End: 2024-05-28
Payer: COMMERCIAL

## 2024-05-28 VITALS — BODY MASS INDEX: 47.09 KG/M2 | WEIGHT: 293 LBS | HEIGHT: 66 IN

## 2024-05-28 DIAGNOSIS — M17.0 BILATERAL PRIMARY OSTEOARTHRITIS OF KNEE: Primary | ICD-10-CM

## 2024-05-28 DIAGNOSIS — M70.52 PES ANSERINUS BURSITIS OF BOTH KNEES: ICD-10-CM

## 2024-05-28 DIAGNOSIS — M70.51 PES ANSERINUS BURSITIS OF BOTH KNEES: ICD-10-CM

## 2024-05-28 PROCEDURE — 99999 PR PBB SHADOW E&M-EST. PATIENT-LVL IV: CPT | Mod: PBBFAC,,, | Performed by: NURSE PRACTITIONER

## 2024-05-28 PROCEDURE — 20610 DRAIN/INJ JOINT/BURSA W/O US: CPT | Mod: 50,S$GLB,, | Performed by: NURSE PRACTITIONER

## 2024-05-28 PROCEDURE — 99499 UNLISTED E&M SERVICE: CPT | Mod: S$GLB,,, | Performed by: NURSE PRACTITIONER

## 2024-05-28 RX ORDER — PROMETHAZINE HYDROCHLORIDE 25 MG/1
25 TABLET ORAL EVERY 6 HOURS PRN
COMMUNITY
Start: 2023-09-13

## 2024-05-28 RX ORDER — LOSARTAN POTASSIUM 50 MG/1
50 TABLET ORAL
COMMUNITY

## 2024-05-28 RX ORDER — METHOCARBAMOL 750 MG/1
750 TABLET, FILM COATED ORAL 4 TIMES DAILY PRN
Qty: 90 TABLET | Refills: 0 | Status: SHIPPED | OUTPATIENT
Start: 2024-05-28 | End: 2024-06-27

## 2024-05-28 RX ORDER — SOLRIAMFETOL 75 MG/1
75 TABLET, FILM COATED ORAL DAILY
COMMUNITY

## 2024-05-28 RX ORDER — LEVOCETIRIZINE DIHYDROCHLORIDE 5 MG/1
5 TABLET, FILM COATED ORAL
COMMUNITY
Start: 2024-05-09

## 2024-05-28 RX ORDER — METHOCARBAMOL 750 MG/1
TABLET, FILM COATED ORAL
COMMUNITY
End: 2024-05-28 | Stop reason: SDUPTHER

## 2024-05-28 RX ORDER — FUROSEMIDE 80 MG/1
80 TABLET ORAL
COMMUNITY
Start: 2024-05-09

## 2024-05-28 RX ORDER — FERROUS GLUCONATE 324(38)MG
1 TABLET ORAL
COMMUNITY
Start: 2024-05-11

## 2024-05-28 RX ORDER — EMPAGLIFLOZIN 10 MG/1
10 TABLET, FILM COATED ORAL
COMMUNITY

## 2024-05-28 RX ORDER — PRAMIPEXOLE DIHYDROCHLORIDE 0.5 MG/1
0.5 TABLET ORAL
COMMUNITY

## 2024-05-28 NOTE — PROCEDURES
Large Joint Aspiration/Injection: bilateral knee    Date/Time: 5/28/2024 8:40 AM    Performed by: Mia Escobar FNP  Authorized by: Mia Escobar FNP    Consent Done?:  Yes (Verbal)  Indications:  Pain  Timeout: prior to procedure the correct patient, procedure, and site was verified    Prep: patient was prepped and draped in usual sterile fashion      Details:  Needle Size:  21 G  Approach:  Anterolateral  Location:  Knee  Laterality:  Bilateral  Site:  Bilateral knee  Medications (Right):  60 mg hyaluronate sodium, stabilized (DUROLANE) 60 mg/3 mL  Medications (Left):  60 mg hyaluronate sodium, stabilized (DUROLANE) 60 mg/3 mL  Patient tolerance:  Patient tolerated the procedure well with no immediate complications

## 2024-05-28 NOTE — LETTER
May 28, 2024      [facility]  [address] [place] [address]  Phone: [phone]       Patient: Valery Akins   YOB: 1975  Date of Visit: 05/28/2024    To Whom It May Concern:    Iliana Akins  was at [facility] on 05/28/2024. The patient may return to work on 05/30/2024 with no restrictions. If you have any questions or concerns, or if I can be of further assistance, please do not hesitate to contact me.    Sincerely,    RUSSELL Franklin

## 2024-05-28 NOTE — PROGRESS NOTES
Chief Complaint   Patient presents with    Left Knee - Pain, Swelling    Right Knee - Pain, Swelling       HPI:  48 y.o. returns to clinic today for the durolane gel injections    Knee exam  No interval change      Assessment & plan      Bilateral primary osteoarthritis of knee  -     methocarbamoL (ROBAXIN) 750 MG Tab; Take 1 tablet (750 mg total) by mouth 4 (four) times daily as needed (muscle spasms).  Dispense: 90 tablet; Refill: 0  -     Large Joint Aspiration/Injection: bilateral knee  -     hyaluronate sodium, stabilized (DUROLANE) 60 mg/3 mL 60 mg  -     hyaluronate sodium, stabilized (DUROLANE) 60 mg/3 mL 60 mg    Pes anserinus bursitis of both knees  -     methocarbamoL (ROBAXIN) 750 MG Tab; Take 1 tablet (750 mg total) by mouth 4 (four) times daily as needed (muscle spasms).  Dispense: 90 tablet; Refill: 0          Using an aseptic technique, I injected durolane into the bilateral knees. The patient tolerated this well. I will have them return to clinic as needed.

## 2024-08-14 ENCOUNTER — OFFICE VISIT (OUTPATIENT)
Dept: ORTHOPEDICS | Facility: CLINIC | Age: 49
End: 2024-08-14
Payer: COMMERCIAL

## 2024-08-14 VITALS — HEIGHT: 66 IN | WEIGHT: 293 LBS | BODY MASS INDEX: 47.09 KG/M2

## 2024-08-14 DIAGNOSIS — G89.29 CHRONIC PAIN OF RIGHT KNEE: ICD-10-CM

## 2024-08-14 DIAGNOSIS — M17.11 PRIMARY OSTEOARTHRITIS OF RIGHT KNEE: ICD-10-CM

## 2024-08-14 DIAGNOSIS — M17.0 BILATERAL PRIMARY OSTEOARTHRITIS OF KNEE: Primary | ICD-10-CM

## 2024-08-14 DIAGNOSIS — M25.561 CHRONIC PAIN OF RIGHT KNEE: ICD-10-CM

## 2024-08-14 PROCEDURE — 99999 PR PBB SHADOW E&M-EST. PATIENT-LVL IV: CPT | Mod: PBBFAC,,, | Performed by: NURSE PRACTITIONER

## 2024-08-14 PROCEDURE — 1159F MED LIST DOCD IN RCRD: CPT | Mod: CPTII,S$GLB,, | Performed by: NURSE PRACTITIONER

## 2024-08-14 PROCEDURE — 3008F BODY MASS INDEX DOCD: CPT | Mod: CPTII,S$GLB,, | Performed by: NURSE PRACTITIONER

## 2024-08-14 PROCEDURE — 20610 DRAIN/INJ JOINT/BURSA W/O US: CPT | Mod: 50,S$GLB,, | Performed by: NURSE PRACTITIONER

## 2024-08-14 PROCEDURE — 99213 OFFICE O/P EST LOW 20 MIN: CPT | Mod: 25,S$GLB,, | Performed by: NURSE PRACTITIONER

## 2024-08-14 PROCEDURE — 4010F ACE/ARB THERAPY RXD/TAKEN: CPT | Mod: CPTII,S$GLB,, | Performed by: NURSE PRACTITIONER

## 2024-08-14 PROCEDURE — 3044F HG A1C LEVEL LT 7.0%: CPT | Mod: CPTII,S$GLB,, | Performed by: NURSE PRACTITIONER

## 2024-08-14 RX ORDER — DICLOFENAC SODIUM 10 MG/G
GEL TOPICAL
COMMUNITY

## 2024-08-14 RX ORDER — METHOCARBAMOL 500 MG/1
1000 TABLET, FILM COATED ORAL 3 TIMES DAILY PRN
Qty: 90 TABLET | Refills: 2 | Status: SHIPPED | OUTPATIENT
Start: 2024-08-14 | End: 2024-11-12

## 2024-08-14 RX ORDER — TRIAMCINOLONE ACETONIDE 40 MG/ML
40 INJECTION, SUSPENSION INTRA-ARTICULAR; INTRAMUSCULAR
Status: DISCONTINUED | OUTPATIENT
Start: 2024-08-14 | End: 2024-08-14 | Stop reason: HOSPADM

## 2024-08-14 RX ADMIN — TRIAMCINOLONE ACETONIDE 40 MG: 40 INJECTION, SUSPENSION INTRA-ARTICULAR; INTRAMUSCULAR at 10:08

## 2024-08-14 NOTE — LETTER
August 14, 2024      [facility]  [address]  [place] [address]  Phone: [phone]       Patient: Valery Akins   YOB: 1975  Date of Visit: 08/14/2024    To Whom It May Concern:    Iliana Akins  was at [facility] on 08/14/2024. The patient may return to work on 8/19/2024 with no restrictions. If you have any questions or concerns, or if I can be of further assistance, please do not hesitate to contact me.    Sincerely,    RUSSELL Franklin

## 2024-08-14 NOTE — PROCEDURES
Large Joint Aspiration/Injection: bilateral knee    Date/Time: 8/14/2024 10:00 AM    Performed by: Mia Escobar FNP  Authorized by: Mia Escobar FNP    Consent Done?:  Yes (Verbal)  Indications:  Pain  Timeout: prior to procedure the correct patient, procedure, and site was verified    Prep: patient was prepped and draped in usual sterile fashion    Local anesthetic:  Lidocaine 1% without epinephrine  Anesthetic total (ml):  5      Details:  Needle Size:  21 G  Approach:  Anterolateral  Location:  Knee  Laterality:  Bilateral  Site:  Bilateral knee  Medications (Right):  40 mg triamcinolone acetonide 40 mg/mL  Medications (Left):  40 mg triamcinolone acetonide 40 mg/mL  Patient tolerance:  Patient tolerated the procedure well with no immediate complications

## 2024-08-14 NOTE — PROGRESS NOTES
Chief Complaint   Patient presents with    Left Knee - Pain    Right Knee - Pain         HPI:   This is a 48 y.o. who presents to clinic today complaining of bilateral knee pain for 2 weeks after no known trauma. Pain is progressively worsening. No numbness or tingling. No associated signs or symptoms.    History reviewed. No pertinent past medical history.  Past Surgical History:   Procedure Laterality Date    HYSTERECTOMY      knee scope Right      Current Outpatient Medications on File Prior to Visit   Medication Sig Dispense Refill    acetaZOLAMIDE (DIAMOX) 250 MG tablet Take 1 tablet by mouth 2 (two) times daily.      acetaZOLAMIDE (DIAMOX) 500 mg CpSR Take 1,000 mg by mouth.      baclofen (LIORESAL) 10 MG tablet Take 10 mg by mouth 2 (two) times daily.      cholecalciferol, vitamin D3, 1,250 mcg (50,000 unit) capsule Take 50,000 Units by mouth every 7 days.      cyanocobalamin 1,000 mcg/mL injection Inject into the muscle.      diclofenac (VOLTAREN) 75 MG EC tablet Take 75 mg by mouth 2 (two) times daily.      diclofenac sodium (VOLTAREN) 1 % Gel Apply topically.      ferrous gluconate (FERGON) 324 MG tablet Take 1 tablet by mouth.      ferrous sulfate 324 mg (65 mg iron) TbEC Take 324 mg by mouth.      folic acid (FOLVITE) 1 MG tablet Take 1,000 mcg by mouth once daily.      furosemide (LASIX) 80 MG tablet Take 80 mg by mouth.      HYDROcodone-acetaminophen (NORCO)  mg per tablet Take 1 tablet by mouth 4 (four) times daily as needed.      JARDIANCE 10 mg tablet Take 10 mg by mouth.      ketoconazole (NIZORAL) 2 % cream Apply topically as needed.      levocetirizine (XYZAL) 5 MG tablet Take 5 mg by mouth.      losartan (COZAAR) 50 MG tablet Take 50 mg by mouth.      metoprolol succinate (TOPROL-XL) 25 MG 24 hr tablet Take 25 mg by mouth 2 (two) times daily.      montelukast (SINGULAIR) 10 mg tablet Take 10 mg by mouth once daily.      pantoprazole (PROTONIX) 40 MG tablet Take 40 mg by mouth 2 (two) times  daily.      potassium chloride (KLOR-CON) 10 MEQ TbSR Take 10 mEq by mouth once daily.      pramipexole (MIRAPEX) 0.5 MG tablet Take 0.5 mg by mouth.      promethazine (PHENERGAN) 25 MG tablet Take 25 mg by mouth every 6 (six) hours as needed.      solriamfetoL (SUNOSI) 75 mg Tab Take 75 mg by mouth once daily.      SYMBICORT 160-4.5 mcg/actuation HFAA Inhale into the lungs.       No current facility-administered medications on file prior to visit.     Review of patient's allergies indicates:   Allergen Reactions    Adhesive tape-silicones      Other reaction(s): Other (See Comments)  CANNOT USE PAPER TAPE OR FABRIC BANDAIDS    Codeine Rash     Only in liquid form like cough meds- per pt. Pt can tolerate hydrocodone and oxycodone       Egg Diarrhea    Lactase      Other reaction(s): Other (See Comments)  STOMACH ISSUES AND DIARRHEA AND SOMETIMES VOMITING     No family history on file.  Social History     Socioeconomic History    Marital status:    Tobacco Use    Smoking status: Never    Smokeless tobacco: Never   Substance and Sexual Activity    Alcohol use: Never    Drug use: Never     Social Determinants of Health     Financial Resource Strain: Unknown (10/5/2021)    Received from [facility]    Overall Financial Resource Strain (CARDIA)     Difficulty of Paying Living Expenses: Patient declined   Food Insecurity: Unknown (10/5/2021)    Received from [facility]    Hunger Vital Sign     Worried About Running Out of Food in the Last Year: Patient declined     Ran Out of Food in the Last Year: Patient declined   Transportation Needs: Unknown (10/5/2021)    Received from [facility]    PRAPARE - Transportation     Lack of Transportation (Medical): Patient declined     Lack of Transportation (Non-Medical): Patient declined   Physical Activity: Unknown (10/5/2021)    Received from [place]  [facility]    Exercise Vital Sign     Days of Exercise per Week: Patient declined     Minutes of Exercise per Session: Patient declined   Stress: Unknown (10/5/2021)    Received from [facility] [place]    [place] [employer] - Occupational Stress Questionnaire     Feeling of Stress : Patient declined       Review of Systems:  Constitutional:  Denies fever or chills   Eyes:  Denies change in visual acuity   HENT:  Denies nasal congestion or sore throat   Respiratory:  Denies cough or shortness of breath   Cardiovascular:  Denies chest pain or edema   GI:  Denies abdominal pain, nausea, vomiting, bloody stools or diarrhea   :  Denies dysuria   Integument:  Denies rash   Neurologic:  Denies headache, focal weakness or sensory changes   Endocrine:  Denies polyuria or polydipsia   Lymphatic:  Denies swollen glands   Psychiatric:  Denies depression or anxiety     Physical Exam:   Constitutional:  Well developed, well nourished, no acute distress, non-toxic appearance   Integument:  Well hydrated, no rash   Lymphatic:  No lymphadenopathy noted   Neurologic:  Alert & oriented x 3, CN 2-12 normal, normal motor function, normal sensory function, no focal deficits noted   Psychiatric:  Speech and behavior appropriate   Eyes: EOMI  Gi: abdomen soft    Bilateral Knee Exam    bilateral Knee Exam     Tenderness   The patient is experiencing tenderness in the medial joint line.    Range of Motion   Extension: abnormal   Flexion: abnormal     Muscle Strength     The patient has normal knee strength.    Tests   Evelyn:  Medial - positive   Lachman:  Anterior - negative      Varus: negative  Valgus: negative  Patellar Apprehension: negative    Other   Erythema: absent  Sensation: normal  Pulse: present  Swelling: mild      bilateral Knee Exam   bilateral knee exam performed same as contralateral side and is normal.                Assessment &  plan    Bilateral primary osteoarthritis of knee  -     methocarbamoL (ROBAXIN) 500 MG Tab; Take 2 tablets (1,000 mg total) by mouth 3 (three) times daily as needed (muscle spasms/aching).  Dispense: 90 tablet; Refill: 2  -     Large Joint Aspiration/Injection: bilateral knee  -     triamcinolone acetonide injection 40 mg  -     triamcinolone acetonide injection 40 mg    Primary osteoarthritis of right knee  -     MRI Knee Without Contrast Right; Future; Expected date: 08/14/2024    Chronic pain of right knee  -     MRI Knee Without Contrast Right; Future; Expected date: 08/14/2024  -     Iovera; Future        Using an aseptic technique, I injected 5 cc of lidocaine 1% without and 1 cc of kenalog 40mg into the bilateral Knee. The patient tolerated this well. I will have them return to clinic in 3 months.  Couldn't afford MRI of right knee here but will give external order to imaging place her insurance would want her to go to.

## 2024-09-17 ENCOUNTER — CLINICAL SUPPORT (OUTPATIENT)
Dept: PHYSICAL MEDICINE AND REHAB | Facility: CLINIC | Age: 49
End: 2024-09-17
Payer: COMMERCIAL

## 2024-09-17 VITALS — SYSTOLIC BLOOD PRESSURE: 169 MMHG | DIASTOLIC BLOOD PRESSURE: 78 MMHG | HEART RATE: 84 BPM

## 2024-09-17 DIAGNOSIS — M17.0 BILATERAL PRIMARY OSTEOARTHRITIS OF KNEE: ICD-10-CM

## 2024-09-17 DIAGNOSIS — M25.562 CHRONIC PAIN OF LEFT KNEE: ICD-10-CM

## 2024-09-17 DIAGNOSIS — M25.561 CHRONIC PAIN OF RIGHT KNEE: Primary | ICD-10-CM

## 2024-09-17 DIAGNOSIS — G89.29 CHRONIC PAIN OF LEFT KNEE: ICD-10-CM

## 2024-09-17 DIAGNOSIS — G89.29 CHRONIC PAIN OF RIGHT KNEE: Primary | ICD-10-CM

## 2024-09-17 PROCEDURE — 99204 OFFICE O/P NEW MOD 45 MIN: CPT | Mod: 25,S$GLB,, | Performed by: PHYSICAL MEDICINE & REHABILITATION

## 2024-09-17 PROCEDURE — 64640 INJECTION TREATMENT OF NERVE: CPT | Mod: RT,S$GLB,, | Performed by: PHYSICAL MEDICINE & REHABILITATION

## 2024-09-17 PROCEDURE — 76942 ECHO GUIDE FOR BIOPSY: CPT | Mod: 26,S$GLB,, | Performed by: PHYSICAL MEDICINE & REHABILITATION

## 2024-09-17 PROCEDURE — 99999 PR PBB SHADOW E&M-EST. PATIENT-LVL IV: CPT | Mod: PBBFAC,,, | Performed by: PHYSICAL MEDICINE & REHABILITATION

## 2024-09-17 NOTE — PROGRESS NOTES
[facility]    Consulting Provider: Mia Escobar  PCP: Salvador Chavez MD    CHIEF COMPLAINT:   Chief Complaint   Patient presents with    Knee Pain     Both        HISTORY OF PRESENT ILLNESS: Valery Akins is a 48 y.o. female who presents to me for evaluation and management of bilateral knee pain.     Today reports, chronic bilateral knee pain without noted traumatic event. Right greater than left at this time. Pain to medial joint line worse with ambulation. Manages with ortho with steroid injections every 3 months with some relief but presents to discuss other options for management.     Here for iovera, referred by ortho. Wanting to perform to right knee.     Hx has had HA injection which made her pain worse.     Medications: baclofen, norco, robaxin  Injections:  - bilateral knee SA steroids 08/14/24  - bilateral pes anserine bursa steroid 04/26/24  Therapies: HEP  Bracing: none  DME: none    Review of Systems   Constitutional: Negative for fever.   HENT: Negative for drooling.    Eyes: Negative for discharge.   Respiratory: Negative for choking.    Cardiovascular: Negative for chest pain.   Genitourinary: Negative for flank pain.   Skin: Negative for wound.   Allergic/Immunologic: Negative for immunocompromised state.   Neurological: Negative for tremors and syncope.   Psychiatric/Behavioral: Negative for behavioral problems.     Past Medical History: History reviewed. No pertinent past medical history.    Past Surgical History:   Past Surgical History:   Procedure Laterality Date    HYSTERECTOMY      knee scope Right        Family History: No family history on file.    Medications:   Current Outpatient Medications on File Prior to Visit   Medication Sig Dispense Refill    acetaZOLAMIDE (DIAMOX) 250 MG tablet Take 1 tablet by mouth 2 (two) times daily.      acetaZOLAMIDE (DIAMOX) 500 mg CpSR Take 1,000 mg by mouth.      baclofen (LIORESAL) 10 MG tablet Take 10 mg  by mouth 2 (two) times daily.      cholecalciferol, vitamin D3, 1,250 mcg (50,000 unit) capsule Take 50,000 Units by mouth every 7 days.      cyanocobalamin 1,000 mcg/mL injection Inject into the muscle.      diclofenac (VOLTAREN) 75 MG EC tablet Take 75 mg by mouth 2 (two) times daily.      diclofenac sodium (VOLTAREN) 1 % Gel Apply topically.      ferrous gluconate (FERGON) 324 MG tablet Take 1 tablet by mouth.      ferrous sulfate 324 mg (65 mg iron) TbEC Take 324 mg by mouth.      folic acid (FOLVITE) 1 MG tablet Take 1,000 mcg by mouth once daily.      furosemide (LASIX) 80 MG tablet Take 80 mg by mouth.      HYDROcodone-acetaminophen (NORCO)  mg per tablet Take 1 tablet by mouth 4 (four) times daily as needed.      JARDIANCE 10 mg tablet Take 10 mg by mouth.      ketoconazole (NIZORAL) 2 % cream Apply topically as needed.      levocetirizine (XYZAL) 5 MG tablet Take 5 mg by mouth.      losartan (COZAAR) 50 MG tablet Take 50 mg by mouth.      methocarbamoL (ROBAXIN) 500 MG Tab Take 2 tablets (1,000 mg total) by mouth 3 (three) times daily as needed (muscle spasms/aching). 90 tablet 2    metoprolol succinate (TOPROL-XL) 25 MG 24 hr tablet Take 25 mg by mouth 2 (two) times daily.      montelukast (SINGULAIR) 10 mg tablet Take 10 mg by mouth once daily.      pantoprazole (PROTONIX) 40 MG tablet Take 40 mg by mouth 2 (two) times daily.      potassium chloride (KLOR-CON) 10 MEQ TbSR Take 10 mEq by mouth once daily.      pramipexole (MIRAPEX) 0.5 MG tablet Take 0.5 mg by mouth.      promethazine (PHENERGAN) 25 MG tablet Take 25 mg by mouth every 6 (six) hours as needed.      solriamfetoL (SUNOSI) 75 mg Tab Take 75 mg by mouth once daily.      SYMBICORT 160-4.5 mcg/actuation HFAA Inhale into the lungs.       No current facility-administered medications on file prior to visit.       Allergies:   Review of patient's allergies indicates:   Allergen Reactions    Adhesive tape-silicones      Other reaction(s): Other  (See Comments)  CANNOT USE PAPER TAPE OR FABRIC BANDAIDS    Codeine Rash     Only in liquid form like cough meds- per pt. Pt can tolerate hydrocodone and oxycodone       Egg Diarrhea    Lactase      Other reaction(s): Other (See Comments)  STOMACH ISSUES AND DIARRHEA AND SOMETIMES VOMITING       Social History:   Social History     Socioeconomic History    Marital status:    Tobacco Use    Smoking status: Never    Smokeless tobacco: Never   Substance and Sexual Activity    Alcohol use: Never    Drug use: Never     Social Determinants of Health     Financial Resource Strain: Unknown (10/5/2021)    Received from [facility]    Overall Financial Resource Strain (CARDIA)     Difficulty of Paying Living Expenses: Patient declined   Food Insecurity: Unknown (10/5/2021)    Received from [facility]    Hunger Vital Sign     Worried About Running Out of Food in the Last Year: Patient declined     Ran Out of Food in the Last Year: Patient declined   Transportation Needs: Unknown (10/5/2021)    Received from [facility]    PRAPARE - Transportation     Lack of Transportation (Medical): Patient declined     Lack of Transportation (Non-Medical): Patient declined   Physical Activity: Unknown (10/5/2021)    Received from [facility]    Exercise Vital Sign     Days of Exercise per Week: Patient declined     Minutes of Exercise per Session: Patient declined   Stress: Unknown (10/5/2021)    Received from [facility]    [place] [place] [employer] - Occupational Stress Questionnaire     Feeling of Stress : Patient declined       PHYSICAL EXAMINATION:   Vitals:    09/17/24 1036   BP: (!) 169/78   Pulse: 84     Constitutional: No apparent distress. Pleasant.  HENT: Trachea midline.  Head: Normocephalic and atraumatic.   Eyes:  Right eye exhibits no discharge. Left eye exhibits no discharge. No scleral icterus.   CV: Well perfused.   Pulmonary/Chest: Effort normal. No respiratory distress.   Abdominal: There is no guarding.   Neurological: Awake, alert and cooperative.  SKIN: Intact no apparent lesions, cut, ulcers or abrasions  EXT:  No cyanosis, clubbing, or edema.  SENSORY: Intact to light touch in the bilateral lower extemities.  MUSCULOSKELETAL:   Muscle Strength:(0-5)                             Right     Left  Hip Flexors                5  5  Hip Abductor              5  5  Hip Adductor              5  5  Knee Extensors          5  5  Knee Flexors              5  5  Ankle Dorsiflex           5  5  Ankle Planterflex        5  5  EHL                            5  5    Reflexes: (0-4+/4)   Right     Left  Patellar(L4)  2+  2+  Ankle(S1)  2+  2+       INSPECTION:                                                                           Right                Left        Localized/Generalized swelling:                     -                       -  Muscle contours normal and symmetrical:     +                      +  Patellas symetrical and level:                         +                      +  Ecchymoses:                                                   -                       -  Erythema:                                                        -                       -  Gross deformity:                                             -                       -     KNEE DEFORMITY:            Right                Left  Normal:                       +                      +  Genu varus:                -                       -  Genu valgum:             -                       -  Genu Recurvatum:     -                       -     RANGE OF MOTION:           Right                Left  Flexion:                       Full                  Full        Extension:                   Full                  Full  Other:      TENDERNESS:                         Right                Left  Medial Tibial Plateau:             -                       -  Lateral Tibial Plateau:             -                       -  Medial Femoral Condyle:        -                       -  Lateral Femoral Condyle:       -                       -  Head of the Fibula:                 -                       -  Medial joint line:                      +                      +  Lateral joint line:                      -                       -  Patella:                                    -                       -  Medial collateral lig:                -                       -  Lateral collateral lig:                -                       -  Pes Anserine:                         -                       -     SOFT TISSUE PALPATION:                                       Right                Left  Baker's cyst:                -                       -             Effusion:                      -                       -  Ballottement:               -                       -  Other:                          -                       -      JOINT STABILITY:           Right                Left  Medial collateral lig:    -                       -  Lateral collateral lig:    -                      -  Anterior draw sign:      -                      -  Posterior draw sign:    -                       -  Lachmans:                  -                       -     SPECIAL TESTS:                   Right                Left  Evelyn Test:                       -                       -  Patella  Grinding Test:            -                       -  Knee Joint Effusion Test:        -                       -     Imaging  XR bilateral knee 10/20/2022 with noted severe tricompartmental osteoarthritis, kellgren dixie grade 4.     Data Reviewed: X-ray    Supportive Actions: Independent visualization of images or test specimens.    ASSESSMENT:   1. Chronic pain of right knee    2. Chronic pain of left knee    3.  "Bilateral primary osteoarthritis of knee        PLAN:   1. Time was spent reviewing the above diagnosis in depth with Valery today, including acute management and rehabilitation.     2. We discussed options for management of her pain would be to consider injection of either short acting steroid or cryoneurolysis to peripheral nerves (iovera). The use, benefits, risks, and expectations of all of these types of injections was discussed at length with her today. At this time, she elects to proceed with ultrasound-guided right  superficial genicular nerves  cryoneurolysis (Iovera) injection(s). This procedure was completed today in clinic. Please see procedure note for further details. We can repeat this every 3 months if appropriate.       3. We will also perform iovera to the left superficial genicular nerves after approval by insurance.      4. Continue with short-acting steroids with Ortho as previously planned.     RTC for planned left knee iovera.     This is a consult from Mia Escobar. Please see the "Communications" section of Epic to see how the consulting physician received the report of today's findings and recommendations. If it's an [facility] provider, it will be forwarded to his/her "in basket".    45 minutes of total time spent on the encounter, which includes face to face time and non-face to face time preparing to see the patient (eg, review of tests), obtaining and/or reviewing separately obtained history, documenting clinical information in the electronic or other health record, independently interpreting results (not separately reported), communicating results to the patient/family/caregiver, and/or care coordination (not separately reported).         IOVERA PROCEDURE NOTE:  CC: right knee pain    48 y.o. Female presents today for Iovera procedure for right knee pain.    Inspection/Palpation:   +Skin warm and dry without open wounds or erythema  -Rubor   -Calor    Procedure Note Iovera:    DATE OF " PROCEDURE:  09/17/2024    PREOPERATIVE DIAGNOSIS: right knee pain.     POSTOPERATIVE DIAGNOSIS: right knee pain.     PROCEDURE: Iovera treatment of anterior femoral cutaneous nerve, lateral femoral cutaneous nerve, medial femoral cutaneous nerve, and superior and inferior branches of infrapatellar saphenous nerve using 4 different punctures to treat all 5 nerves. (cpt 64640 x5)    COMPLICATIONS: None.     IMPLANTS:  None    ESTIMATED BLOOD LOSS:  < 5cc    SPECIMENS REMOVED:  None    ANESTHESIA: 10cc Local lidocaine without epinephrine    INDICATIONS FOR PROCEDURE: This is a 48 y.o. female with longstanding knee pain. They have failed non operative management including injections. I discussed a treatment therapy called Iovera, which is cryotherapy, to provide symptomatic relief along the sensory distribution of the infrapatellar tendon branch of the saphenous nerve  and FCN. The patient was given patient information and literature to review prior to the procedure as well.  Based on this, the patient agreed to move forward with doing the procedure.    Time was spent discussing the cryoanalgesia procedure Iovera with the patient.  Risks and benefits were also discussed with patient.  X-rays were reviewed to use as a diagram to explain procedure. A detailed description of landmarks and placement of anesthetic used during procedure were also explained to patient.  Contraindications for procedure were discussed with patient.    CONSENT:  Verbal consent was obtained from the patient.     PROCEDURE:    A surgical time out was performed, including verification of patient ID, procedure to be performed, site and side, availability of information and equipment, review of safety issues, and the patients agreement and consent.  The patient was placed supine on the exam table and the proximal medial aspect of the right tibia and anterior aspect of distal femur was prepped with sterile chlorhexidine solution. Ultrasound was used  to visualize and map out the targeted nerves. We then infiltrated the skin with lidocaine without epinephrine using a 25g needle. We then hydrodissected the nerve with 1cc lidocaine without epinephrine using a 22g spinal needle under ultrasound guidance. We then introduced the Iovera device using ultrasound guidance and this device penetrated the skin, creating cryotherapy to the superior and inferior branches of the infrapatellar saphenous nerve, a third treatment to the medial femoral cutaneous nerve, a fourth treatment to the anterior femoral cutaneous nerve, and fifth treatment to the lateral femoral cutaneous nerve. 4 punctures of the skin were made to treat the superior and inferior branches of the ISN, the MFCN, the AFCN and LFCN. There were a total of 5 nerves treated with iovera. The patient tolerated the procedure well with no problems.    PAIN SCORES:  Pre-procedure:  7/10  Post-procedure:  2/10      ASSESSMENT:      ICD-10-CM ICD-9-CM   1. Chronic pain of right knee  M25.561 719.46    G89.29 338.29   2. Chronic pain of left knee  M25.562 719.46    G89.29 338.29   3. Bilateral primary osteoarthritis of knee  M17.0 715.16         PLAN:  Post-procedure instructions given.     All questions were answered to the best of my ability and all concerns were addressed at this time.

## 2024-09-20 ENCOUNTER — TELEPHONE (OUTPATIENT)
Dept: PHYSICAL MEDICINE AND REHAB | Facility: CLINIC | Age: 49
End: 2024-09-20
Payer: COMMERCIAL

## 2024-09-20 NOTE — TELEPHONE ENCOUNTER
Spoke with pt and was able to move her appointment for IOVERA  of her left knee to a sooner time. Referral linked.  Pt did thank this nurse for being kind to her during the procedure and Dr. Gomez and myself for keeping her mind off of the procedure and talking to her to make her more comfortable.  This nurse thanked her for the kinds words and let her know it was our pleasure.  No further needs at this time.   Miladis oPwell RN       ----- Message from Nancy Hdez sent at 9/20/2024  4:15 PM CDT -----  This msg is for Miladis   Please call pt regarding an appt for Tuesday instead of 10-1  [phone]

## 2024-09-24 ENCOUNTER — CLINICAL SUPPORT (OUTPATIENT)
Dept: PHYSICAL MEDICINE AND REHAB | Facility: CLINIC | Age: 49
End: 2024-09-24
Payer: COMMERCIAL

## 2024-09-24 VITALS
HEIGHT: 66 IN | DIASTOLIC BLOOD PRESSURE: 75 MMHG | WEIGHT: 293 LBS | HEART RATE: 79 BPM | SYSTOLIC BLOOD PRESSURE: 142 MMHG | BODY MASS INDEX: 47.09 KG/M2

## 2024-09-24 DIAGNOSIS — G89.29 CHRONIC PAIN OF LEFT KNEE: ICD-10-CM

## 2024-09-24 DIAGNOSIS — M25.562 CHRONIC PAIN OF LEFT KNEE: ICD-10-CM

## 2024-09-24 PROCEDURE — 64640 INJECTION TREATMENT OF NERVE: CPT | Mod: 58,LT,S$GLB, | Performed by: PHYSICAL MEDICINE & REHABILITATION

## 2024-09-24 PROCEDURE — 99499 UNLISTED E&M SERVICE: CPT | Mod: S$GLB,,, | Performed by: PHYSICAL MEDICINE & REHABILITATION

## 2024-09-24 PROCEDURE — 76942 ECHO GUIDE FOR BIOPSY: CPT | Mod: 26,S$GLB,, | Performed by: PHYSICAL MEDICINE & REHABILITATION

## 2024-09-24 PROCEDURE — 99999 PR PBB SHADOW E&M-EST. PATIENT-LVL IV: CPT | Mod: PBBFAC,,, | Performed by: PHYSICAL MEDICINE & REHABILITATION

## 2024-10-28 ENCOUNTER — TELEPHONE (OUTPATIENT)
Dept: ORTHOPEDICS | Facility: CLINIC | Age: 49
End: 2024-10-28
Payer: COMMERCIAL

## 2024-10-28 DIAGNOSIS — M17.11 PRIMARY OSTEOARTHRITIS OF RIGHT KNEE: ICD-10-CM

## 2024-10-28 DIAGNOSIS — M17.12 PRIMARY OSTEOARTHRITIS OF LEFT KNEE: Primary | ICD-10-CM

## 2024-11-11 ENCOUNTER — HOSPITAL ENCOUNTER (OUTPATIENT)
Dept: RADIOLOGY | Facility: HOSPITAL | Age: 49
Discharge: HOME | End: 2024-11-11
Attending: NURSE PRACTITIONER
Payer: COMMERCIAL

## 2024-11-11 DIAGNOSIS — M17.12 PRIMARY OSTEOARTHRITIS OF LEFT KNEE: ICD-10-CM

## 2024-11-11 DIAGNOSIS — M17.11 PRIMARY OSTEOARTHRITIS OF RIGHT KNEE: ICD-10-CM

## 2024-11-11 PROCEDURE — 73721 MRI JNT OF LWR EXTRE W/O DYE: CPT | Mod: TC,PO,LT

## 2024-11-11 PROCEDURE — 73721 MRI JNT OF LWR EXTRE W/O DYE: CPT | Mod: 26,LT,, | Performed by: RADIOLOGY

## 2024-11-11 PROCEDURE — 73721 MRI JNT OF LWR EXTRE W/O DYE: CPT | Mod: TC,PO,RT

## 2024-11-12 ENCOUNTER — PATIENT MESSAGE (OUTPATIENT)
Dept: ORTHOPEDICS | Facility: CLINIC | Age: 49
End: 2024-11-12
Payer: COMMERCIAL

## 2024-11-14 ENCOUNTER — OFFICE VISIT (OUTPATIENT)
Dept: ORTHOPEDICS | Facility: CLINIC | Age: 49
End: 2024-11-14
Payer: COMMERCIAL

## 2024-11-14 VITALS — BODY MASS INDEX: 47.09 KG/M2 | WEIGHT: 293 LBS | HEIGHT: 66 IN

## 2024-11-14 DIAGNOSIS — M17.12 PRIMARY OSTEOARTHRITIS OF LEFT KNEE: Primary | ICD-10-CM

## 2024-11-14 PROCEDURE — 1159F MED LIST DOCD IN RCRD: CPT | Mod: CPTII,S$GLB,, | Performed by: NURSE PRACTITIONER

## 2024-11-14 PROCEDURE — 3044F HG A1C LEVEL LT 7.0%: CPT | Mod: CPTII,S$GLB,, | Performed by: NURSE PRACTITIONER

## 2024-11-14 PROCEDURE — 3008F BODY MASS INDEX DOCD: CPT | Mod: CPTII,S$GLB,, | Performed by: NURSE PRACTITIONER

## 2024-11-14 PROCEDURE — 4010F ACE/ARB THERAPY RXD/TAKEN: CPT | Mod: CPTII,S$GLB,, | Performed by: NURSE PRACTITIONER

## 2024-11-14 PROCEDURE — 1160F RVW MEDS BY RX/DR IN RCRD: CPT | Mod: CPTII,S$GLB,, | Performed by: NURSE PRACTITIONER

## 2024-11-14 PROCEDURE — 99212 OFFICE O/P EST SF 10 MIN: CPT | Mod: S$GLB,,, | Performed by: NURSE PRACTITIONER

## 2024-11-14 PROCEDURE — 99999 PR PBB SHADOW E&M-EST. PATIENT-LVL III: CPT | Mod: PBBFAC,,, | Performed by: NURSE PRACTITIONER

## 2024-11-14 RX ORDER — SOLRIAMFETOL 150 MG/1
1 TABLET, FILM COATED ORAL EVERY MORNING
COMMUNITY
Start: 2024-10-04

## 2024-11-14 RX ORDER — DOXYCYCLINE 100 MG/1
100 CAPSULE ORAL 2 TIMES DAILY
COMMUNITY
Start: 2024-11-06

## 2024-11-14 RX ORDER — METHOCARBAMOL 500 MG/1
TABLET, FILM COATED ORAL
COMMUNITY
Start: 2024-11-12

## 2024-11-14 RX ORDER — METOLAZONE 2.5 MG/1
TABLET ORAL
COMMUNITY
Start: 2024-11-12

## 2024-11-14 RX ORDER — SEMAGLUTIDE 1 MG/.5ML
1 INJECTION, SOLUTION SUBCUTANEOUS
Qty: 2 ML | Refills: 0 | Status: SHIPPED | OUTPATIENT
Start: 2024-11-14 | End: 2024-12-14

## 2024-11-14 RX ORDER — ALBUTEROL SULFATE 90 UG/1
INHALANT RESPIRATORY (INHALATION)
COMMUNITY
Start: 2024-11-12

## 2024-11-14 NOTE — PROGRESS NOTES
Chief Complaint   Patient presents with    Left Knee - Pain    Right Knee - Pain         HPI:   This is a 48 y.o. who presents to clinic today complaining of bilateral knee pain for 2 months after no known trauma. Pain is progressively worsening. No numbness or tingling. No associated signs or symptoms.    History reviewed. No pertinent past medical history.  Past Surgical History:   Procedure Laterality Date    HYSTERECTOMY      knee scope Right      Current Outpatient Medications on File Prior to Visit   Medication Sig Dispense Refill    acetaZOLAMIDE (DIAMOX) 250 MG tablet Take 1 tablet by mouth 2 (two) times daily.      acetaZOLAMIDE (DIAMOX) 500 mg CpSR Take 1,000 mg by mouth.      albuterol (PROVENTIL/VENTOLIN HFA) 90 mcg/actuation inhaler       bacitracin 500 unit/gram Oint Apply topically 2 (two) times daily. 113 g 0    baclofen (LIORESAL) 10 MG tablet Take 10 mg by mouth 2 (two) times daily.      cholecalciferol, vitamin D3, 1,250 mcg (50,000 unit) capsule Take 50,000 Units by mouth every 7 days.      cyanocobalamin 1,000 mcg/mL injection Inject into the muscle.      diclofenac (VOLTAREN) 75 MG EC tablet Take 75 mg by mouth 2 (two) times daily.      diclofenac sodium (VOLTAREN) 1 % Gel Apply topically.      doxycycline (VIBRAMYCIN) 100 MG Cap Take 100 mg by mouth 2 (two) times daily.      ferrous gluconate (FERGON) 324 MG tablet Take 1 tablet by mouth.      ferrous sulfate 324 mg (65 mg iron) TbEC Take 324 mg by mouth.      folic acid (FOLVITE) 1 MG tablet Take 1,000 mcg by mouth once daily.      furosemide (LASIX) 80 MG tablet Take 80 mg by mouth.      HYDROcodone-acetaminophen (NORCO)  mg per tablet Take 1 tablet by mouth 4 (four) times daily as needed.      JARDIANCE 10 mg tablet Take 10 mg by mouth.      ketoconazole (NIZORAL) 2 % cream Apply topically as needed.      levocetirizine (XYZAL) 5 MG tablet Take 5 mg by mouth.      losartan (COZAAR) 50 MG tablet Take 50 mg by mouth.      methocarbamoL  (ROBAXIN) 500 MG Tab       metOLazone (ZAROXOLYN) 2.5 MG tablet       metoprolol succinate (TOPROL-XL) 25 MG 24 hr tablet Take 25 mg by mouth 2 (two) times daily.      montelukast (SINGULAIR) 10 mg tablet Take 10 mg by mouth once daily.      pantoprazole (PROTONIX) 40 MG tablet Take 40 mg by mouth 2 (two) times daily.      potassium chloride (KLOR-CON) 10 MEQ TbSR Take 10 mEq by mouth once daily.      pramipexole (MIRAPEX) 0.5 MG tablet Take 0.5 mg by mouth.      promethazine (PHENERGAN) 25 MG tablet Take 25 mg by mouth every 6 (six) hours as needed.      SUNOSI 150 mg Tab Take 1 tablet by mouth every morning.      SYMBICORT 160-4.5 mcg/actuation HFAA Inhale into the lungs.      [DISCONTINUED] solriamfetoL (SUNOSI) 75 mg Tab Take 75 mg by mouth once daily.       No current facility-administered medications on file prior to visit.     Review of patient's allergies indicates:   Allergen Reactions    Adhesive tape-silicones      Other reaction(s): Other (See Comments)  CANNOT USE PAPER TAPE OR FABRIC BANDAIDS    Codeine Rash     Only in liquid form like cough meds- per pt. Pt can tolerate hydrocodone and oxycodone       Egg Diarrhea    Lactase      Other reaction(s): Other (See Comments)  STOMACH ISSUES AND DIARRHEA AND SOMETIMES VOMITING     No family history on file.  Social History     Socioeconomic History    Marital status:    Tobacco Use    Smoking status: Never    Smokeless tobacco: Never   Substance and Sexual Activity    Alcohol use: Never    Drug use: Never     Social Drivers of Health     Financial Resource Strain: Unknown (10/5/2021)    [place] [facility] [place]    [place] Financial Resource Strain (CARDIA)     Difficulty of Paying Living Expenses: Patient declined   Food Insecurity: Unknown (10/5/2021)    [place] [facility]    Hunger Vital Sign     Worried About Running Out of Food in the Last Year: Patient  declined     Ran Out of Food in the Last Year: Patient declined   Transportation Needs: Unknown (10/5/2021)    Received from [facility] [place]    PRAPARE - Transportation     Lack of Transportation (Medical): Patient declined     Lack of Transportation (Non-Medical): Patient declined   Physical Activity: Unknown (10/5/2021)    Received from [facility] [place]    Exercise Vital Sign     Days of Exercise per Week: Patient declined     Minutes of Exercise per Session: Patient declined   Stress: Unknown (10/5/2021)    Received from [facility] [place]    [employer] - Occupational Stress Questionnaire     Feeling of Stress : Patient declined       Review of Systems:  Constitutional:  Denies fever or chills   Eyes:  Denies change in visual acuity   HENT:  Denies nasal congestion or sore throat   Respiratory:  Denies cough or shortness of breath   Cardiovascular:  Denies chest pain or edema   GI:  Denies abdominal pain, nausea, vomiting, bloody stools or diarrhea   :  Denies dysuria   Integument:  Denies rash   Neurologic:  Denies headache, focal weakness or sensory changes   Endocrine:  Denies polyuria or polydipsia   Lymphatic:  Denies swollen glands   Psychiatric:  Denies depression or anxiety     Physical Exam:   Constitutional:  Well developed, well nourished, no acute distress, non-toxic appearance   Integument:  Well hydrated, no rash   Lymphatic:  No lymphadenopathy noted   Neurologic:  Alert & oriented x 3, CN 2-12 normal, normal motor function, normal sensory function, no focal deficits noted   Psychiatric:  Speech and behavior appropriate   Eyes: EOMI  Gi: abdomen soft    Bilateral Knee Exam    bilateral Knee Exam     Tenderness   The patient is experiencing tenderness in the medial joint line.    Range of Motion   Extension: abnormal   Flexion: abnormal     Muscle Strength     The  patient has normal knee strength.    Tests   Evelyn:  Medial - positive   Lachman:  Anterior - negative      Varus: negative  Valgus: negative  Patellar Apprehension: negative    Other   Erythema: absent  Sensation: normal  Pulse: present  Swelling: mild      bilateral Knee Exam   bilateral knee exam performed same as contralateral side and is normal.                Primary osteoarthritis of left knee    BMI 60.0-69.9, adult  -     semaglutide, weight loss, (WEGOVY) 1 mg/0.5 mL PnIj; Inject 1 mg into the skin every 7 days.  Dispense: 2 mL; Refill: 0      Using an aseptic technique, I injected 5 cc of lidocaine 1% without and 1 cc of kenalog 40mg into the bilateral Knee. The patient tolerated this well. I will have them return to clinic in 3 months.

## 2024-11-27 DIAGNOSIS — G89.29 CHRONIC PAIN OF LEFT KNEE: Primary | ICD-10-CM

## 2024-11-27 DIAGNOSIS — M25.562 CHRONIC PAIN OF LEFT KNEE: Primary | ICD-10-CM

## 2024-11-27 DIAGNOSIS — M25.561 CHRONIC PAIN OF RIGHT KNEE: ICD-10-CM

## 2024-11-27 DIAGNOSIS — G89.29 CHRONIC PAIN OF RIGHT KNEE: ICD-10-CM

## 2024-12-17 ENCOUNTER — CLINICAL SUPPORT (OUTPATIENT)
Dept: PHYSICAL MEDICINE AND REHAB | Facility: CLINIC | Age: 49
End: 2024-12-17
Payer: COMMERCIAL

## 2024-12-17 VITALS — HEART RATE: 70 BPM | SYSTOLIC BLOOD PRESSURE: 149 MMHG | DIASTOLIC BLOOD PRESSURE: 65 MMHG

## 2024-12-17 DIAGNOSIS — M25.561 CHRONIC PAIN OF RIGHT KNEE: ICD-10-CM

## 2024-12-17 DIAGNOSIS — G89.29 CHRONIC PAIN OF RIGHT KNEE: ICD-10-CM

## 2024-12-17 PROCEDURE — 64640 INJECTION TREATMENT OF NERVE: CPT | Mod: RT,S$GLB,, | Performed by: PHYSICAL MEDICINE & REHABILITATION

## 2024-12-17 PROCEDURE — 99999 PR PBB SHADOW E&M-EST. PATIENT-LVL II: CPT | Mod: PBBFAC,,, | Performed by: PHYSICAL MEDICINE & REHABILITATION

## 2024-12-17 PROCEDURE — 99499 UNLISTED E&M SERVICE: CPT | Mod: S$GLB,,, | Performed by: PHYSICAL MEDICINE & REHABILITATION

## 2024-12-17 PROCEDURE — 76942 ECHO GUIDE FOR BIOPSY: CPT | Mod: 26,S$GLB,, | Performed by: PHYSICAL MEDICINE & REHABILITATION

## 2024-12-17 NOTE — PROGRESS NOTES
[facility] PROCEDURE NOTE    CHIEF COMPLAINT:   Chief Complaint   Patient presents with    Knee Pain     Both        HISTORY OF PRESENT ILLNESS: Valery Akins is a 48 y.o. female who presents to me for planned procedure/injection of cryoneurolysis (Iovera) for management of the below noted diagnosis.     Medications:   Current Outpatient Medications on File Prior to Visit   Medication Sig Dispense Refill    acetaZOLAMIDE (DIAMOX) 250 MG tablet Take 1 tablet by mouth 2 (two) times daily.      acetaZOLAMIDE (DIAMOX) 500 mg CpSR Take 1,000 mg by mouth.      albuterol (PROVENTIL/VENTOLIN HFA) 90 mcg/actuation inhaler       bacitracin 500 unit/gram Oint Apply topically 2 (two) times daily. 113 g 0    baclofen (LIORESAL) 10 MG tablet Take 10 mg by mouth 2 (two) times daily.      cholecalciferol, vitamin D3, 1,250 mcg (50,000 unit) capsule Take 50,000 Units by mouth every 7 days.      cyanocobalamin 1,000 mcg/mL injection Inject into the muscle.      diclofenac (VOLTAREN) 75 MG EC tablet Take 75 mg by mouth 2 (two) times daily.      diclofenac sodium (VOLTAREN) 1 % Gel Apply topically.      doxycycline (VIBRAMYCIN) 100 MG Cap Take 100 mg by mouth 2 (two) times daily.      ferrous gluconate (FERGON) 324 MG tablet Take 1 tablet by mouth.      ferrous sulfate 324 mg (65 mg iron) TbEC Take 324 mg by mouth.      folic acid (FOLVITE) 1 MG tablet Take 1,000 mcg by mouth once daily.      furosemide (LASIX) 80 MG tablet Take 80 mg by mouth.      HYDROcodone-acetaminophen (NORCO)  mg per tablet Take 1 tablet by mouth 4 (four) times daily as needed.      JARDIANCE 10 mg tablet Take 10 mg by mouth.      ketoconazole (NIZORAL) 2 % cream Apply topically as needed.      levocetirizine (XYZAL) 5 MG tablet Take 5 mg by mouth.      losartan (COZAAR) 50 MG tablet Take 50 mg by mouth.      methocarbamoL (ROBAXIN) 500 MG Tab       metOLazone (ZAROXOLYN) 2.5 MG tablet       metoprolol succinate  [Breast Self Exam] : breast self exam (TOPROL-XL) 25 MG 24 hr tablet Take 25 mg by mouth 2 (two) times daily.      montelukast (SINGULAIR) 10 mg tablet Take 10 mg by mouth once daily.      pantoprazole (PROTONIX) 40 MG tablet Take 40 mg by mouth 2 (two) times daily.      potassium chloride (KLOR-CON) 10 MEQ TbSR Take 10 mEq by mouth once daily.      pramipexole (MIRAPEX) 0.5 MG tablet Take 0.5 mg by mouth.      promethazine (PHENERGAN) 25 MG tablet Take 25 mg by mouth every 6 (six) hours as needed.      SUNOSI 150 mg Tab Take 1 tablet by mouth every morning.      SYMBICORT 160-4.5 mcg/actuation HFAA Inhale into the lungs.       No current facility-administered medications on file prior to visit.       Allergies:   Review of patient's allergies indicates:   Allergen Reactions    Adhesive tape-silicones      Other reaction(s): Other (See Comments)  CANNOT USE PAPER TAPE OR FABRIC BANDAIDS    Codeine Rash     Only in liquid form like cough meds- per pt. Pt can tolerate hydrocodone and oxycodone       Egg Diarrhea    Lactase      Other reaction(s): Other (See Comments)  STOMACH ISSUES AND DIARRHEA AND SOMETIMES VOMITING       Vitals:   Vitals:    12/17/24 1248   BP: (!) 149/65   Pulse: 70       ASSESSMENT:   1. Chronic pain of right knee        PLAN:   1. Procedure/injection was completed for management of above diagnosis.    2. Please see procedure note from today's visit with further details.     Right superficial genicular iovera    RTC for planned repeat left superficial genicular iovera      IOVERA PROCEDURE NOTE:  CC: right knee pain    48 y.o. Female presents today for Iovera procedure for right knee pain.    Inspection/Palpation:   +Skin warm and dry without open wounds or erythema  -Rubor   -Calor    Procedure Note Iovera:    DATE OF PROCEDURE:  12/17/2024    PREOPERATIVE DIAGNOSIS: right knee pain.     POSTOPERATIVE DIAGNOSIS: right knee pain.     PROCEDURE: Iovera treatment of anterior femoral cutaneous nerve, lateral femoral cutaneous nerve,  [HPV Vaccine] : HPV Vaccine medial femoral cutaneous nerve, and superior and inferior branches of infrapatellar saphenous nerve using 4 different punctures to treat all 5 nerves. (cpt 64640 x5)    COMPLICATIONS: None.     IMPLANTS:  None    ESTIMATED BLOOD LOSS:  < 5cc    SPECIMENS REMOVED:  None    ANESTHESIA: 10cc Local lidocaine without epinephrine    INDICATIONS FOR PROCEDURE: This is a 48 y.o. female with longstanding knee pain. They have failed non operative management including injections. I discussed a treatment therapy called Iovera, which is cryotherapy, to provide symptomatic relief along the sensory distribution of the infrapatellar tendon branch of the saphenous nerve  and FCN. The patient was given patient information and literature to review prior to the procedure as well.  Based on this, the patient agreed to move forward with doing the procedure.    Time was spent discussing the cryoanalgesia procedure Iovera with the patient.  Risks and benefits were also discussed with patient.  X-rays were reviewed to use as a diagram to explain procedure. A detailed description of landmarks and placement of anesthetic used during procedure were also explained to patient.  Contraindications for procedure were discussed with patient.    CONSENT:  Verbal consent was obtained from the patient.     PROCEDURE:    A surgical time out was performed, including verification of patient ID, procedure to be performed, site and side, availability of information and equipment, review of safety issues, and the patients agreement and consent.  The patient was placed supine on the exam table and the proximal medial aspect of the right tibia and anterior aspect of distal femur was prepped with sterile chlorhexidine solution. Ultrasound was used to visualize and map out the targeted nerves. We then infiltrated the skin with lidocaine without epinephrine using a 25g needle. We then hydrodissected the nerve with 1cc lidocaine without epinephrine using a 22g  spinal needle under ultrasound guidance. We then introduced the Iovera device using ultrasound guidance and this device penetrated the skin, creating cryotherapy to the superior and inferior branches of the infrapatellar saphenous nerve, a third treatment to the medial femoral cutaneous nerve, a fourth treatment to the anterior femoral cutaneous nerve, and fifth treatment to the lateral femoral cutaneous nerve. 4 punctures of the skin were made to treat the superior and inferior branches of the ISN, the MFCN, the AFCN and LFCN. There were a total of 5 nerves treated with iovera. The patient tolerated the procedure well with no problems.      ASSESSMENT:      ICD-10-CM ICD-9-CM   1. Chronic pain of right knee  M25.561 719.46    G89.29 338.29         PLAN:  Post-procedure instructions given.     All questions were answered to the best of my ability and all concerns were addressed at this time.

## 2024-12-23 ENCOUNTER — CLINICAL SUPPORT (OUTPATIENT)
Dept: PHYSICAL MEDICINE AND REHAB | Facility: CLINIC | Age: 49
End: 2024-12-23
Payer: COMMERCIAL

## 2024-12-23 VITALS — SYSTOLIC BLOOD PRESSURE: 165 MMHG | HEART RATE: 84 BPM | DIASTOLIC BLOOD PRESSURE: 74 MMHG

## 2024-12-23 DIAGNOSIS — M25.562 CHRONIC PAIN OF LEFT KNEE: ICD-10-CM

## 2024-12-23 DIAGNOSIS — M25.561 CHRONIC PAIN OF RIGHT KNEE: Primary | ICD-10-CM

## 2024-12-23 DIAGNOSIS — G89.29 CHRONIC PAIN OF LEFT KNEE: ICD-10-CM

## 2024-12-23 DIAGNOSIS — G89.29 CHRONIC PAIN OF RIGHT KNEE: Primary | ICD-10-CM

## 2024-12-23 PROCEDURE — 99499 UNLISTED E&M SERVICE: CPT | Mod: S$GLB,,, | Performed by: PHYSICAL MEDICINE & REHABILITATION

## 2024-12-23 PROCEDURE — 64640 INJECTION TREATMENT OF NERVE: CPT | Mod: LT,S$GLB,, | Performed by: PHYSICAL MEDICINE & REHABILITATION

## 2024-12-23 PROCEDURE — 76942 ECHO GUIDE FOR BIOPSY: CPT | Mod: S$GLB,,, | Performed by: PHYSICAL MEDICINE & REHABILITATION

## 2024-12-23 PROCEDURE — 99999 PR PBB SHADOW E&M-EST. PATIENT-LVL II: CPT | Mod: PBBFAC,,, | Performed by: PHYSICAL MEDICINE & REHABILITATION

## 2024-12-23 NOTE — PROGRESS NOTES
[facility] PROCEDURE NOTE    CHIEF COMPLAINT:   Chief Complaint   Patient presents with    Knee Pain     Left       HISTORY OF PRESENT ILLNESS: Valery Akins is a 48 y.o. female who presents to me for planned procedure/injection of cryoneurolysis (Iovera) for management of the below noted diagnosis.       Medications:   Current Outpatient Medications on File Prior to Visit   Medication Sig Dispense Refill    acetaZOLAMIDE (DIAMOX) 250 MG tablet Take 1 tablet by mouth 2 (two) times daily.      acetaZOLAMIDE (DIAMOX) 500 mg CpSR Take 1,000 mg by mouth.      albuterol (PROVENTIL/VENTOLIN HFA) 90 mcg/actuation inhaler       bacitracin 500 unit/gram Oint Apply topically 2 (two) times daily. 113 g 0    baclofen (LIORESAL) 10 MG tablet Take 10 mg by mouth 2 (two) times daily.      cholecalciferol, vitamin D3, 1,250 mcg (50,000 unit) capsule Take 50,000 Units by mouth every 7 days.      cyanocobalamin 1,000 mcg/mL injection Inject into the muscle.      diclofenac (VOLTAREN) 75 MG EC tablet Take 75 mg by mouth 2 (two) times daily.      diclofenac sodium (VOLTAREN) 1 % Gel Apply topically.      doxycycline (VIBRAMYCIN) 100 MG Cap Take 100 mg by mouth 2 (two) times daily.      ferrous gluconate (FERGON) 324 MG tablet Take 1 tablet by mouth.      ferrous sulfate 324 mg (65 mg iron) TbEC Take 324 mg by mouth.      folic acid (FOLVITE) 1 MG tablet Take 1,000 mcg by mouth once daily.      furosemide (LASIX) 80 MG tablet Take 80 mg by mouth.      HYDROcodone-acetaminophen (NORCO)  mg per tablet Take 1 tablet by mouth 4 (four) times daily as needed.      JARDIANCE 10 mg tablet Take 10 mg by mouth.      ketoconazole (NIZORAL) 2 % cream Apply topically as needed.      levocetirizine (XYZAL) 5 MG tablet Take 5 mg by mouth.      losartan (COZAAR) 50 MG tablet Take 50 mg by mouth.      methocarbamoL (ROBAXIN) 500 MG Tab       metOLazone (ZAROXOLYN) 2.5 MG tablet       metoprolol succinate  (TOPROL-XL) 25 MG 24 hr tablet Take 25 mg by mouth 2 (two) times daily.      montelukast (SINGULAIR) 10 mg tablet Take 10 mg by mouth once daily.      pantoprazole (PROTONIX) 40 MG tablet Take 40 mg by mouth 2 (two) times daily.      potassium chloride (KLOR-CON) 10 MEQ TbSR Take 10 mEq by mouth once daily.      pramipexole (MIRAPEX) 0.5 MG tablet Take 0.5 mg by mouth.      promethazine (PHENERGAN) 25 MG tablet Take 25 mg by mouth every 6 (six) hours as needed.      SUNOSI 150 mg Tab Take 1 tablet by mouth every morning.      SYMBICORT 160-4.5 mcg/actuation HFAA Inhale into the lungs.       No current facility-administered medications on file prior to visit.       Allergies:   Review of patient's allergies indicates:   Allergen Reactions    Adhesive tape-silicones      Other reaction(s): Other (See Comments)  CANNOT USE PAPER TAPE OR FABRIC BANDAIDS    Codeine Rash     Only in liquid form like cough meds- per pt. Pt can tolerate hydrocodone and oxycodone       Egg Diarrhea    Lactase      Other reaction(s): Other (See Comments)  STOMACH ISSUES AND DIARRHEA AND SOMETIMES VOMITING       Vitals:   Vitals:    12/23/24 1340   BP: (!) 165/74   Pulse: 84       ASSESSMENT:   1. Chronic pain of left knee        PLAN:   1. Procedure/injection was completed for management of above diagnosis.    2. Please see procedure note from today's visit with further details.     Left superficial genicular iovera    RTC as needed. Okay to repeat iovera every 3 months.     IOVERA PROCEDURE NOTE:  CC: left knee pain    48 y.o. Female presents today for Iovera procedure for left knee pain.    Inspection/Palpation:   +Skin warm and dry without open wounds or erythema  -Rubor   -Calor    Procedure Note Iovera:    DATE OF PROCEDURE:  12/23/2024    PREOPERATIVE DIAGNOSIS: left knee pain.     POSTOPERATIVE DIAGNOSIS: left knee pain.     PROCEDURE: Iovera treatment of anterior femoral cutaneous nerve, lateral femoral cutaneous nerve, medial femoral  cutaneous nerve, and superior and inferior branches of infrapatellar saphenous nerve using 4 different punctures to treat all 5 nerves. (cpt 64640 x5)    COMPLICATIONS: None.     IMPLANTS:  None    ESTIMATED BLOOD LOSS:  < 5cc    SPECIMENS REMOVED:  None    ANESTHESIA: 10cc Local lidocaine without epinephrine    INDICATIONS FOR PROCEDURE: This is a 48 y.o. female with longstanding knee pain. They have failed non operative management including injections. I discussed a treatment therapy called Iovera, which is cryotherapy, to provide symptomatic relief along the sensory distribution of the infrapatellar tendon branch of the saphenous nerve  and FCN. The patient was given patient information and literature to review prior to the procedure as well.  Based on this, the patient agreed to move forward with doing the procedure.    Time was spent discussing the cryoanalgesia procedure Iovera with the patient.  Risks and benefits were also discussed with patient.  X-rays were reviewed to use as a diagram to explain procedure. A detailed description of landmarks and placement of anesthetic used during procedure were also explained to patient.  Contraindications for procedure were discussed with patient.    CONSENT:  Verbal consent was obtained from the patient.     PROCEDURE:    A surgical time out was performed, including verification of patient ID, procedure to be performed, site and side, availability of information and equipment, review of safety issues, and the patients agreement and consent.  The patient was placed supine on the exam table and the proximal medial aspect of the left tibia and anterior aspect of distal femur was prepped with sterile chlorhexidine solution. Ultrasound was used to visualize and map out the targeted nerves. We then infiltrated the skin with lidocaine without epinephrine using a 25g needle. We then hydrodissected the nerve with 1cc lidocaine without epinephrine using a 22g spinal needle  under ultrasound guidance. We then introduced the Iovera device using ultrasound guidance and this device penetrated the skin, creating cryotherapy to the superior and inferior branches of the infrapatellar saphenous nerve, a third treatment to the medial femoral cutaneous nerve, a fourth treatment to the anterior femoral cutaneous nerve, and fifth treatment to the lateral femoral cutaneous nerve. 4 punctures of the skin were made to treat the superior and inferior branches of the ISN, the MFCN, the AFCN and LFCN. There were a total of 5 nerves treated with iovera. The patient tolerated the procedure well with no problems.    PAIN SCORES:  Pre-procedure:  7/10  Post-procedure:  0/10      ASSESSMENT:      ICD-10-CM ICD-9-CM   1. Chronic pain of left knee  M25.562 719.46    G89.29 338.29         PLAN:  Post-procedure instructions given.     All questions were answered to the best of my ability and all concerns were addressed at this time.

## 2025-02-14 ENCOUNTER — OFFICE VISIT (OUTPATIENT)
Dept: ORTHOPEDICS | Facility: CLINIC | Age: 50
End: 2025-02-14
Payer: COMMERCIAL

## 2025-02-14 VITALS
WEIGHT: 293 LBS | HEART RATE: 84 BPM | BODY MASS INDEX: 47.09 KG/M2 | HEIGHT: 66 IN | DIASTOLIC BLOOD PRESSURE: 74 MMHG | SYSTOLIC BLOOD PRESSURE: 165 MMHG

## 2025-02-14 DIAGNOSIS — M17.0 BILATERAL PRIMARY OSTEOARTHRITIS OF KNEE: Primary | ICD-10-CM

## 2025-02-14 PROCEDURE — 99999 PR PBB SHADOW E&M-EST. PATIENT-LVL III: CPT | Mod: PBBFAC,,, | Performed by: NURSE PRACTITIONER

## 2025-02-14 RX ORDER — TRIAMCINOLONE ACETONIDE 40 MG/ML
40 INJECTION, SUSPENSION INTRA-ARTICULAR; INTRAMUSCULAR
Status: DISCONTINUED | OUTPATIENT
Start: 2025-02-14 | End: 2025-02-14 | Stop reason: HOSPADM

## 2025-02-14 RX ADMIN — TRIAMCINOLONE ACETONIDE 40 MG: 40 INJECTION, SUSPENSION INTRA-ARTICULAR; INTRAMUSCULAR at 10:02

## 2025-02-14 NOTE — PROGRESS NOTES
Chief Complaint   Patient presents with    Left Knee - Pain    Right Knee - Pain         HPI:   This is a 49 y.o. who presents to clinic today complaining of bilateral knee pain for 3 weeks after no known trauma. Pain is progressively worsening. No numbness or tingling. No associated signs or symptoms.    History reviewed. No pertinent past medical history.  Past Surgical History:   Procedure Laterality Date    HYSTERECTOMY      knee scope Right      Current Outpatient Medications on File Prior to Visit   Medication Sig Dispense Refill    acetaZOLAMIDE (DIAMOX) 250 MG tablet Take 1 tablet by mouth 2 (two) times daily.      acetaZOLAMIDE (DIAMOX) 500 mg CpSR Take 1,000 mg by mouth.      albuterol (PROVENTIL/VENTOLIN HFA) 90 mcg/actuation inhaler       bacitracin 500 unit/gram Oint Apply topically 2 (two) times daily. 113 g 0    baclofen (LIORESAL) 10 MG tablet Take 10 mg by mouth 2 (two) times daily.      cholecalciferol, vitamin D3, 1,250 mcg (50,000 unit) capsule Take 50,000 Units by mouth every 7 days.      cyanocobalamin 1,000 mcg/mL injection Inject into the muscle.      diclofenac (VOLTAREN) 75 MG EC tablet Take 75 mg by mouth 2 (two) times daily.      diclofenac sodium (VOLTAREN) 1 % Gel Apply topically.      doxycycline (VIBRAMYCIN) 100 MG Cap Take 100 mg by mouth 2 (two) times daily.      ferrous gluconate (FERGON) 324 MG tablet Take 1 tablet by mouth.      ferrous sulfate 324 mg (65 mg iron) TbEC Take 324 mg by mouth.      folic acid (FOLVITE) 1 MG tablet Take 1,000 mcg by mouth once daily.      furosemide (LASIX) 80 MG tablet Take 80 mg by mouth.      HYDROcodone-acetaminophen (NORCO)  mg per tablet Take 1 tablet by mouth 4 (four) times daily as needed.      JARDIANCE 10 mg tablet Take 10 mg by mouth.      ketoconazole (NIZORAL) 2 % cream Apply topically as needed.      levocetirizine (XYZAL) 5 MG tablet Take 5 mg by mouth.      losartan (COZAAR) 50 MG tablet Take 50 mg by mouth.      methocarbamoL  (ROBAXIN) 500 MG Tab       metOLazone (ZAROXOLYN) 2.5 MG tablet       metoprolol succinate (TOPROL-XL) 25 MG 24 hr tablet Take 25 mg by mouth 2 (two) times daily.      montelukast (SINGULAIR) 10 mg tablet Take 10 mg by mouth once daily.      pantoprazole (PROTONIX) 40 MG tablet Take 40 mg by mouth 2 (two) times daily.      potassium chloride (KLOR-CON) 10 MEQ TbSR Take 10 mEq by mouth once daily.      pramipexole (MIRAPEX) 0.5 MG tablet Take 0.5 mg by mouth.      promethazine (PHENERGAN) 25 MG tablet Take 25 mg by mouth every 6 (six) hours as needed.      SUNOSI 150 mg Tab Take 1 tablet by mouth every morning.      SYMBICORT 160-4.5 mcg/actuation HFAA Inhale into the lungs.       No current facility-administered medications on file prior to visit.     Review of patient's allergies indicates:   Allergen Reactions    Adhesive tape-silicones      Other reaction(s): Other (See Comments)  CANNOT USE PAPER TAPE OR FABRIC BANDAIDS    Codeine Rash     Only in liquid form like cough meds- per pt. Pt can tolerate hydrocodone and oxycodone       Egg Diarrhea    Lactase      Other reaction(s): Other (See Comments)  STOMACH ISSUES AND DIARRHEA AND SOMETIMES VOMITING     No family history on file.  Social History     Socioeconomic History    Marital status:    Tobacco Use    Smoking status: Never    Smokeless tobacco: Never   Substance and Sexual Activity    Alcohol use: Never    Drug use: Never     Social Drivers of Health     Financial Resource Strain: Unknown (10/5/2021)    [place] [facility] [place] Financial Resource Strain (CARDIA)     Difficulty of Paying Living Expenses: Patient declined   Food Insecurity: Unknown (10/5/2021)    [place] [facility]    Hunger Vital Sign     Worried About Running Out of Food in the Last Year: Patient declined     Ran Out of Food in the Last Year: Patient declined   Transportation Needs:  Unknown (10/5/2021)    [place] [facility]APARE - Transportation     Lack of Transportation (Medical): Patient declined     Lack of Transportation (Non-Medical): Patient declined   Physical Activity: Unknown (10/5/2021)    [place] [facility]    Exercise Vital Sign     Days of Exercise per Week: Patient declined     Minutes of Exercise per Session: Patient declined   Stress: Unknown (10/5/2021)    [place] [facility]    [employer] - Occupational Stress Questionnaire     Feeling of Stress : Patient declined       Review of Systems:  Constitutional:  Denies fever or chills   Eyes:  Denies change in visual acuity   HENT:  Denies nasal congestion or sore throat   Respiratory:  Denies cough or shortness of breath   Cardiovascular:  Denies chest pain or edema   GI:  Denies abdominal pain, nausea, vomiting, bloody stools or diarrhea   :  Denies dysuria   Integument:  Denies rash   Neurologic:  Denies headache, focal weakness or sensory changes   Endocrine:  Denies polyuria or polydipsia   Lymphatic:  Denies swollen glands   Psychiatric:  Denies depression or anxiety     Physical Exam:   Constitutional:  Well developed, well nourished, no acute distress, non-toxic appearance   Integument:  Well hydrated, no rash   Lymphatic:  No lymphadenopathy noted   Neurologic:  Alert & oriented x 3, CN 2-12 normal, normal motor function, normal sensory function, no focal deficits noted   Psychiatric:  Speech and behavior appropriate   Eyes: EOMI  Gi: abdomen soft    Bilateral Knee Exam    bilateral Knee Exam     Tenderness   The patient is experiencing tenderness in the medial joint line.    Range of Motion   Extension: abnormal   Flexion: abnormal     Muscle Strength     The patient has normal knee strength.    Tests   Evelyn:  Medial - positive   Lachman:   Anterior - negative      Varus: negative  Valgus: negative  Patellar Apprehension: negative    Other   Erythema: absent  Sensation: normal  Pulse: present  Swelling: mild      bilateral Knee Exam   bilateral knee exam performed same as contralateral side and is normal.          Bilateral primary osteoarthritis of knee  -     Large Joint Aspiration/Injection: bilateral knee  -     triamcinolone acetonide injection 40 mg  -     triamcinolone acetonide injection 40 mg            Using an aseptic technique, I injected 5 cc of lidocaine 1% without and 1 cc of kenalog 40mg into the bilateral Knee. The patient tolerated this well. I will have them return to clinic in 3 months.

## 2025-02-14 NOTE — LETTER
February 14, 2025      [facility]  [address] [place] [address]  Phone: [phone]       Patient: Valery Akins   YOB: 1975  Date of Visit: 02/14/2025    To Whom It May Concern:    Iliana Akins  was at [facility] on 02/14/2025. The patient may return to work on 03/03/2025 with no restrictions. If you have any questions or concerns, or if I can be of further assistance, please do not hesitate to contact me.    Sincerely,  Mia Escobar NP

## 2025-02-14 NOTE — PROCEDURES
Large Joint Aspiration/Injection: bilateral knee    Date/Time: 2/14/2025 10:20 AM    Performed by: Mia Escobar FNP  Authorized by: Mia Escobar FNP    Consent Done?:  Yes (Verbal)  Indications:  Pain  Timeout: prior to procedure the correct patient, procedure, and site was verified    Prep: patient was prepped and draped in usual sterile fashion    Local anesthetic:  Lidocaine 1% without epinephrine  Anesthetic total (ml):  5      Details:  Needle Size:  21 G  Approach:  Anterolateral  Location:  Knee  Laterality:  Bilateral  Site:  Bilateral knee  Medications (Right):  40 mg triamcinolone acetonide 40 mg/mL  Medications (Left):  40 mg triamcinolone acetonide 40 mg/mL  Patient tolerance:  Patient tolerated the procedure well with no immediate complications

## 2025-03-17 ENCOUNTER — CLINICAL SUPPORT (OUTPATIENT)
Dept: PHYSICAL MEDICINE AND REHAB | Facility: CLINIC | Age: 50
End: 2025-03-17
Payer: COMMERCIAL

## 2025-03-17 VITALS
DIASTOLIC BLOOD PRESSURE: 81 MMHG | BODY MASS INDEX: 47.09 KG/M2 | WEIGHT: 293 LBS | HEART RATE: 66 BPM | HEIGHT: 66 IN | SYSTOLIC BLOOD PRESSURE: 160 MMHG

## 2025-03-17 DIAGNOSIS — M25.561 CHRONIC PAIN OF RIGHT KNEE: ICD-10-CM

## 2025-03-17 DIAGNOSIS — G89.29 CHRONIC PAIN OF RIGHT KNEE: ICD-10-CM

## 2025-03-17 PROCEDURE — 99499 UNLISTED E&M SERVICE: CPT | Mod: S$GLB,,, | Performed by: PHYSICAL MEDICINE & REHABILITATION

## 2025-03-17 PROCEDURE — 99999 PR PBB SHADOW E&M-EST. PATIENT-LVL III: CPT | Mod: PBBFAC,,, | Performed by: PHYSICAL MEDICINE & REHABILITATION

## 2025-03-17 PROCEDURE — 64624 DSTRJ NULYT AGT GNCLR NRV: CPT | Mod: RT,S$GLB,, | Performed by: PHYSICAL MEDICINE & REHABILITATION

## 2025-03-17 NOTE — PROGRESS NOTES
[facility] PROCEDURE NOTE    CHIEF COMPLAINT:   Chief Complaint   Patient presents with    Procedure     R Knee iovera       HISTORY OF PRESENT ILLNESS: Valery Akins is a 49 y.o. female who presents to me for planned procedure/injection of cryoneurolysis (Iovera) for management of the below noted diagnosis.     Medications:   Current Outpatient Medications on File Prior to Visit   Medication Sig Dispense Refill    acetaZOLAMIDE (DIAMOX) 250 MG tablet Take 1 tablet by mouth 2 (two) times daily.      acetaZOLAMIDE (DIAMOX) 500 mg CpSR Take 1,000 mg by mouth.      albuterol (PROVENTIL/VENTOLIN HFA) 90 mcg/actuation inhaler       bacitracin 500 unit/gram Oint Apply topically 2 (two) times daily. 113 g 0    baclofen (LIORESAL) 10 MG tablet Take 10 mg by mouth 2 (two) times daily.      cholecalciferol, vitamin D3, 1,250 mcg (50,000 unit) capsule Take 50,000 Units by mouth every 7 days.      cyanocobalamin 1,000 mcg/mL injection Inject into the muscle.      diclofenac (VOLTAREN) 75 MG EC tablet Take 75 mg by mouth 2 (two) times daily.      diclofenac sodium (VOLTAREN) 1 % Gel Apply topically.      doxycycline (VIBRAMYCIN) 100 MG Cap Take 100 mg by mouth 2 (two) times daily.      ferrous gluconate (FERGON) 324 MG tablet Take 1 tablet by mouth.      ferrous sulfate 324 mg (65 mg iron) TbEC Take 324 mg by mouth.      folic acid (FOLVITE) 1 MG tablet Take 1,000 mcg by mouth once daily.      furosemide (LASIX) 80 MG tablet Take 80 mg by mouth.      HYDROcodone-acetaminophen (NORCO)  mg per tablet Take 1 tablet by mouth 4 (four) times daily as needed.      JARDIANCE 10 mg tablet Take 10 mg by mouth.      ketoconazole (NIZORAL) 2 % cream Apply topically as needed.      levocetirizine (XYZAL) 5 MG tablet Take 5 mg by mouth.      losartan (COZAAR) 50 MG tablet Take 50 mg by mouth.      methocarbamoL (ROBAXIN) 500 MG Tab       metOLazone (ZAROXOLYN) 2.5 MG tablet       metoprolol  succinate (TOPROL-XL) 25 MG 24 hr tablet Take 25 mg by mouth 2 (two) times daily.      montelukast (SINGULAIR) 10 mg tablet Take 10 mg by mouth once daily.      pantoprazole (PROTONIX) 40 MG tablet Take 40 mg by mouth 2 (two) times daily.      potassium chloride (KLOR-CON) 10 MEQ TbSR Take 10 mEq by mouth once daily.      pramipexole (MIRAPEX) 0.5 MG tablet Take 0.5 mg by mouth.      promethazine (PHENERGAN) 25 MG tablet Take 25 mg by mouth every 6 (six) hours as needed.      SUNOSI 150 mg Tab Take 1 tablet by mouth every morning.      SYMBICORT 160-4.5 mcg/actuation HFAA Inhale into the lungs.       No current facility-administered medications on file prior to visit.       Allergies:   Review of patient's allergies indicates:   Allergen Reactions    Adhesive tape-silicones      Other reaction(s): Other (See Comments)  CANNOT USE PAPER TAPE OR FABRIC BANDAIDS    Codeine Rash     Only in liquid form like cough meds- per pt. Pt can tolerate hydrocodone and oxycodone       Egg Diarrhea    Lactase      Other reaction(s): Other (See Comments)  STOMACH ISSUES AND DIARRHEA AND SOMETIMES VOMITING       Vitals:   Vitals:    03/17/25 1319   BP: (!) 160/81   Pulse: 66       ASSESSMENT:   1. Chronic pain of right knee        PLAN:   1. Procedure/injection was completed for management of above diagnosis.    2. Please see procedure note from today's visit with further details.     Right superficial genicular iovera    RTC for planned repeat left superficial genicular iovera      IOVERA PROCEDURE NOTE:  CC: right knee pain    49 y.o. Female presents today for Iovera procedure for right knee pain.    Inspection/Palpation:   +Skin warm and dry without open wounds or erythema  -Rubor   -Calor    Procedure Note Iovera:    DATE OF PROCEDURE:  03/17/2025    PREOPERATIVE DIAGNOSIS: right knee pain.     POSTOPERATIVE DIAGNOSIS: right knee pain.     PROCEDURE: Iovera treatment of anterior femoral cutaneous nerve, lateral femoral cutaneous  nerve, medial femoral cutaneous nerve, and superior and inferior branches of infrapatellar saphenous nerve using 4 different punctures to treat all 5 nerves. (cpt 64640 x5)    COMPLICATIONS: None.     IMPLANTS:  None    ESTIMATED BLOOD LOSS:  < 5cc    SPECIMENS REMOVED:  None    ANESTHESIA: 10cc Local lidocaine without epinephrine    INDICATIONS FOR PROCEDURE: This is a 49 y.o. female with longstanding knee pain. They have failed non operative management including injections. I discussed a treatment therapy called Iovera, which is cryotherapy, to provide symptomatic relief along the sensory distribution of the infrapatellar tendon branch of the saphenous nerve  and FCN. The patient was given patient information and literature to review prior to the procedure as well.  Based on this, the patient agreed to move forward with doing the procedure.    Time was spent discussing the cryoanalgesia procedure Iovera with the patient.  Risks and benefits were also discussed with patient.  X-rays were reviewed to use as a diagram to explain procedure. A detailed description of landmarks and placement of anesthetic used during procedure were also explained to patient.  Contraindications for procedure were discussed with patient.    CONSENT:  Verbal consent was obtained from the patient.     PROCEDURE:    A surgical time out was performed, including verification of patient ID, procedure to be performed, site and side, availability of information and equipment, review of safety issues, and the patients agreement and consent.  The patient was placed supine on the exam table and the proximal medial aspect of the right tibia and anterior aspect of distal femur was prepped with sterile chlorhexidine solution. Ultrasound was used to visualize and map out the targeted nerves. We then infiltrated the skin with lidocaine without epinephrine using a 25g needle. We then hydrodissected the nerve with 1cc lidocaine without epinephrine using a  22g spinal needle under ultrasound guidance. We then introduced the Iovera device using ultrasound guidance and this device penetrated the skin, creating cryotherapy to the superior and inferior branches of the infrapatellar saphenous nerve, a third treatment to the medial femoral cutaneous nerve, a fourth treatment to the anterior femoral cutaneous nerve, and fifth treatment to the lateral femoral cutaneous nerve. 4 punctures of the skin were made to treat the superior and inferior branches of the ISN, the MFCN, the AFCN and LFCN. There were a total of 5 nerves treated with iovera. The patient tolerated the procedure well with no problems.      ASSESSMENT:      ICD-10-CM ICD-9-CM   1. Chronic pain of right knee  M25.561 719.46    G89.29 338.29     PLAN:  Post-procedure instructions given.     All questions were answered to the best of my ability and all concerns were addressed at this time.

## 2025-03-24 ENCOUNTER — CLINICAL SUPPORT (OUTPATIENT)
Dept: PHYSICAL MEDICINE AND REHAB | Facility: CLINIC | Age: 50
End: 2025-03-24
Payer: COMMERCIAL

## 2025-03-24 VITALS
HEART RATE: 78 BPM | DIASTOLIC BLOOD PRESSURE: 59 MMHG | BODY MASS INDEX: 47.09 KG/M2 | HEIGHT: 66 IN | WEIGHT: 293 LBS | SYSTOLIC BLOOD PRESSURE: 130 MMHG

## 2025-03-24 DIAGNOSIS — G89.29 CHRONIC PAIN OF RIGHT KNEE: ICD-10-CM

## 2025-03-24 DIAGNOSIS — M25.561 CHRONIC PAIN OF RIGHT KNEE: ICD-10-CM

## 2025-03-24 DIAGNOSIS — M25.562 CHRONIC PAIN OF LEFT KNEE: Primary | ICD-10-CM

## 2025-03-24 DIAGNOSIS — G89.29 CHRONIC PAIN OF LEFT KNEE: Primary | ICD-10-CM

## 2025-03-24 PROCEDURE — 99999 PR PBB SHADOW E&M-EST. PATIENT-LVL III: CPT | Mod: PBBFAC,,, | Performed by: PHYSICAL MEDICINE & REHABILITATION

## 2025-03-24 PROCEDURE — 64624 DSTRJ NULYT AGT GNCLR NRV: CPT | Mod: S$GLB,,, | Performed by: PHYSICAL MEDICINE & REHABILITATION

## 2025-03-24 PROCEDURE — 99499 UNLISTED E&M SERVICE: CPT | Mod: S$GLB,,, | Performed by: PHYSICAL MEDICINE & REHABILITATION

## 2025-03-24 NOTE — PROGRESS NOTES
[facility] PROCEDURE NOTE    CHIEF COMPLAINT:   Chief Complaint   Patient presents with    Knee Pain       HISTORY OF PRESENT ILLNESS: Valery Akins is a 49 y.o. female who presents to me for planned procedure/injection of cryoneurolysis (Iovera) for management of the below noted diagnosis.     Medications:   Current Outpatient Medications on File Prior to Visit   Medication Sig Dispense Refill    acetaZOLAMIDE (DIAMOX) 250 MG tablet Take 1 tablet by mouth 2 (two) times daily.      acetaZOLAMIDE (DIAMOX) 500 mg CpSR Take 1,000 mg by mouth.      albuterol (PROVENTIL/VENTOLIN HFA) 90 mcg/actuation inhaler       bacitracin 500 unit/gram Oint Apply topically 2 (two) times daily. 113 g 0    baclofen (LIORESAL) 10 MG tablet Take 10 mg by mouth 2 (two) times daily.      cholecalciferol, vitamin D3, 1,250 mcg (50,000 unit) capsule Take 50,000 Units by mouth every 7 days.      cyanocobalamin 1,000 mcg/mL injection Inject into the muscle.      diclofenac (VOLTAREN) 75 MG EC tablet Take 75 mg by mouth 2 (two) times daily.      diclofenac sodium (VOLTAREN) 1 % Gel Apply topically.      doxycycline (VIBRAMYCIN) 100 MG Cap Take 100 mg by mouth 2 (two) times daily.      ferrous gluconate (FERGON) 324 MG tablet Take 1 tablet by mouth.      ferrous sulfate 324 mg (65 mg iron) TbEC Take 324 mg by mouth.      folic acid (FOLVITE) 1 MG tablet Take 1,000 mcg by mouth once daily.      furosemide (LASIX) 80 MG tablet Take 80 mg by mouth.      HYDROcodone-acetaminophen (NORCO)  mg per tablet Take 1 tablet by mouth 4 (four) times daily as needed.      JARDIANCE 10 mg tablet Take 10 mg by mouth.      ketoconazole (NIZORAL) 2 % cream Apply topically as needed.      levocetirizine (XYZAL) 5 MG tablet Take 5 mg by mouth.      losartan (COZAAR) 50 MG tablet Take 50 mg by mouth.      methocarbamoL (ROBAXIN) 500 MG Tab       metOLazone (ZAROXOLYN) 2.5 MG tablet       metoprolol succinate  (TOPROL-XL) 25 MG 24 hr tablet Take 25 mg by mouth 2 (two) times daily.      montelukast (SINGULAIR) 10 mg tablet Take 10 mg by mouth once daily.      pantoprazole (PROTONIX) 40 MG tablet Take 40 mg by mouth 2 (two) times daily.      potassium chloride (KLOR-CON) 10 MEQ TbSR Take 10 mEq by mouth once daily.      pramipexole (MIRAPEX) 0.5 MG tablet Take 0.5 mg by mouth.      promethazine (PHENERGAN) 25 MG tablet Take 25 mg by mouth every 6 (six) hours as needed.      SUNOSI 150 mg Tab Take 1 tablet by mouth every morning.      SYMBICORT 160-4.5 mcg/actuation HFAA Inhale into the lungs.       No current facility-administered medications on file prior to visit.       Allergies:   Review of patient's allergies indicates:   Allergen Reactions    Adhesive tape-silicones      Other reaction(s): Other (See Comments)  CANNOT USE PAPER TAPE OR FABRIC BANDAIDS    Codeine Rash     Only in liquid form like cough meds- per pt. Pt can tolerate hydrocodone and oxycodone       Egg Diarrhea    Lactase      Other reaction(s): Other (See Comments)  STOMACH ISSUES AND DIARRHEA AND SOMETIMES VOMITING       Vitals:   Vitals:    03/24/25 1400   BP: (!) 130/59   Pulse: 78       ASSESSMENT:   1. Chronic pain of left knee    2. Chronic pain of right knee        PLAN:   1. Procedure/injection was completed for management of above diagnosis.    2. Please see procedure note from today's visit with further details.     Left superficial genicular iovera     RTC 1 month to assess response to bilateral knee iovera.       IOVERA PROCEDURE NOTE:  CC: left knee pain    49 y.o. Female presents today for Iovera procedure for left knee pain.    Inspection/Palpation:   +Skin warm and dry without open wounds or erythema  -Rubor   -Calor    Procedure Note Iovera:    DATE OF PROCEDURE:  03/24/2025    PREOPERATIVE DIAGNOSIS: left knee pain.     POSTOPERATIVE DIAGNOSIS: left knee pain.     PROCEDURE: Iovera treatment of anterior femoral cutaneous nerve, lateral  femoral cutaneous nerve, medial femoral cutaneous nerve, and superior and inferior branches of infrapatellar saphenous nerve using 4 different punctures to treat all 5 nerves. (cpt 64640 x5)    COMPLICATIONS: None.     IMPLANTS:  None    ESTIMATED BLOOD LOSS:  < 5cc    SPECIMENS REMOVED:  None    ANESTHESIA: 10cc Local lidocaine without epinephrine    INDICATIONS FOR PROCEDURE: This is a 49 y.o. female with longstanding knee pain. They have failed non operative management including injections. I discussed a treatment therapy called Iovera, which is cryotherapy, to provide symptomatic relief along the sensory distribution of the infrapatellar tendon branch of the saphenous nerve  and FCN. The patient was given patient information and literature to review prior to the procedure as well.  Based on this, the patient agreed to move forward with doing the procedure.    Time was spent discussing the cryoanalgesia procedure Iovera with the patient.  Risks and benefits were also discussed with patient.  X-rays were reviewed to use as a diagram to explain procedure. A detailed description of landmarks and placement of anesthetic used during procedure were also explained to patient.  Contraindications for procedure were discussed with patient.    CONSENT:  Verbal consent was obtained from the patient.     PROCEDURE:    A surgical time out was performed, including verification of patient ID, procedure to be performed, site and side, availability of information and equipment, review of safety issues, and the patients agreement and consent.  The patient was placed supine on the exam table and the proximal medial aspect of the left tibia and anterior aspect of distal femur was prepped with sterile chlorhexidine solution. Ultrasound was used to visualize and map out the targeted nerves. We then infiltrated the skin with lidocaine without epinephrine using a 25g needle. We then hydrodissected the nerve with 1cc lidocaine without  epinephrine using a 22g spinal needle under ultrasound guidance. We then introduced the Iovera device using ultrasound guidance and this device penetrated the skin, creating cryotherapy to the superior and inferior branches of the infrapatellar saphenous nerve, a third treatment to the medial femoral cutaneous nerve, a fourth treatment to the anterior femoral cutaneous nerve, and fifth treatment to the lateral femoral cutaneous nerve. 4 punctures of the skin were made to treat the superior and inferior branches of the ISN, the MFCN, the AFCN and LFCN. There were a total of 5 nerves treated with iovera. The patient tolerated the procedure well with no problems.    PAIN SCORES:  Pre-procedure:  9/10  Post-procedure:  0/10      ASSESSMENT:      ICD-10-CM ICD-9-CM   1. Chronic pain of left knee  M25.562 719.46    G89.29 338.29   2. Chronic pain of right knee  M25.561 719.46    G89.29 338.29     PLAN:  Post-procedure instructions given.     All questions were answered to the best of my ability and all concerns were addressed at this time.

## 2025-06-30 ENCOUNTER — TELEPHONE (OUTPATIENT)
Dept: ORTHOPEDICS | Facility: CLINIC | Age: 50
End: 2025-06-30
Payer: COMMERCIAL

## 2025-06-30 NOTE — TELEPHONE ENCOUNTER
"Spoke with Mrs. Akins, regarding left knee popping and pain. Mrs. Rasmussen stated," That she would like a work excuse to request time off until she is seen by Dr. Gomez for her injection. I denied this request, she was advise to contact Dr. Villalobos office regarding this request. Also advised her to ice and elevate her knee for relief from the pain.  She stated," She will contact her PCP or Dr. Gomez for her work leave request.    "

## 2025-07-01 ENCOUNTER — TELEPHONE (OUTPATIENT)
Dept: ORTHOPEDICS | Facility: CLINIC | Age: 50
End: 2025-07-01
Payer: COMMERCIAL

## 2025-07-01 DIAGNOSIS — M17.0 BILATERAL PRIMARY OSTEOARTHRITIS OF KNEE: Primary | ICD-10-CM

## 2025-07-01 RX ORDER — METHYLPREDNISOLONE 4 MG/1
TABLET ORAL
Qty: 21 EACH | Refills: 0 | Status: SHIPPED | OUTPATIENT
Start: 2025-07-01 | End: 2025-07-22

## 2025-07-01 NOTE — TELEPHONE ENCOUNTER
----- Message from Jorge Kelley sent at 6/30/2025  9:23 AM CDT -----    ----- Message -----  From: Mo Jimenez  Sent: 6/30/2025   8:22 AM CDT  To: Luz MORAN Staff    Patient would like a callback regarding her left knee pain and popping.     [phone]

## 2025-07-22 ENCOUNTER — OFFICE VISIT (OUTPATIENT)
Dept: ORTHOPEDICS | Facility: CLINIC | Age: 50
End: 2025-07-22
Payer: COMMERCIAL

## 2025-07-22 VITALS
SYSTOLIC BLOOD PRESSURE: 130 MMHG | BODY MASS INDEX: 47.09 KG/M2 | WEIGHT: 293 LBS | HEIGHT: 66 IN | HEART RATE: 78 BPM | DIASTOLIC BLOOD PRESSURE: 59 MMHG

## 2025-07-22 DIAGNOSIS — M17.11 PRIMARY OSTEOARTHRITIS OF RIGHT KNEE: ICD-10-CM

## 2025-07-22 DIAGNOSIS — M17.12 PRIMARY OSTEOARTHRITIS OF LEFT KNEE: Primary | ICD-10-CM

## 2025-07-22 DIAGNOSIS — M17.0: ICD-10-CM

## 2025-07-22 DIAGNOSIS — M23.51 RECURRENT RIGHT KNEE INSTABILITY: ICD-10-CM

## 2025-07-22 DIAGNOSIS — M23.52 RECURRENT LEFT KNEE INSTABILITY: ICD-10-CM

## 2025-07-22 PROCEDURE — 3008F BODY MASS INDEX DOCD: CPT | Mod: CPTII,S$GLB,, | Performed by: NURSE PRACTITIONER

## 2025-07-22 PROCEDURE — 20610 DRAIN/INJ JOINT/BURSA W/O US: CPT | Mod: 50,S$GLB,, | Performed by: NURSE PRACTITIONER

## 2025-07-22 PROCEDURE — 99999 PR PBB SHADOW E&M-EST. PATIENT-LVL V: CPT | Mod: PBBFAC,,, | Performed by: NURSE PRACTITIONER

## 2025-07-22 PROCEDURE — 4010F ACE/ARB THERAPY RXD/TAKEN: CPT | Mod: CPTII,S$GLB,, | Performed by: NURSE PRACTITIONER

## 2025-07-22 PROCEDURE — 3075F SYST BP GE 130 - 139MM HG: CPT | Mod: CPTII,S$GLB,, | Performed by: NURSE PRACTITIONER

## 2025-07-22 PROCEDURE — 1159F MED LIST DOCD IN RCRD: CPT | Mod: CPTII,S$GLB,, | Performed by: NURSE PRACTITIONER

## 2025-07-22 PROCEDURE — 3044F HG A1C LEVEL LT 7.0%: CPT | Mod: CPTII,S$GLB,, | Performed by: NURSE PRACTITIONER

## 2025-07-22 PROCEDURE — 3078F DIAST BP <80 MM HG: CPT | Mod: CPTII,S$GLB,, | Performed by: NURSE PRACTITIONER

## 2025-07-22 PROCEDURE — 1160F RVW MEDS BY RX/DR IN RCRD: CPT | Mod: CPTII,S$GLB,, | Performed by: NURSE PRACTITIONER

## 2025-07-22 PROCEDURE — 99214 OFFICE O/P EST MOD 30 MIN: CPT | Mod: 25,S$GLB,, | Performed by: NURSE PRACTITIONER

## 2025-07-22 RX ORDER — TRIAMCINOLONE ACETONIDE 40 MG/ML
40 INJECTION, SUSPENSION INTRA-ARTICULAR; INTRAMUSCULAR
Status: DISCONTINUED | OUTPATIENT
Start: 2025-07-22 | End: 2025-07-22 | Stop reason: HOSPADM

## 2025-07-22 RX ADMIN — TRIAMCINOLONE ACETONIDE 40 MG: 40 INJECTION, SUSPENSION INTRA-ARTICULAR; INTRAMUSCULAR at 03:07

## 2025-07-22 NOTE — LETTER
July 22, 2025      [facility]  [address]  [place] [address]  Phone: [phone]       Patient: Valery Akins   YOB: 1975  Date of Visit: 07/22/2025    To Whom It May Concern:    Iliana Akins  was at [facility] on 7/14/2025. The patient may return to work on 7/28/2025 with no restrictions. If you have any questions or concerns, or if I can be of further assistance, please do not hesitate to contact me.    Sincerely,    Mia Escobar NP

## 2025-07-22 NOTE — PROGRESS NOTES
Chief Complaint   Patient presents with    Right Knee - Pain    Left Knee - Pain         HPI:   This is a 49 y.o. who presents to clinic today complaining of bilateral knee pain for 2 months after no known trauma. Pain is progressively worsening. No numbness or tingling. No associated signs or symptoms.    History reviewed. No pertinent past medical history.  Past Surgical History:   Procedure Laterality Date    HYSTERECTOMY      knee scope Right      Medications Ordered Prior to Encounter[1]  Review of patient's allergies indicates:   Allergen Reactions    Adhesive tape-silicones      Other reaction(s): Other (See Comments)  CANNOT USE PAPER TAPE OR FABRIC BANDAIDS    Codeine Rash     Only in liquid form like cough meds- per pt. Pt can tolerate hydrocodone and oxycodone       Egg Diarrhea    Lactase      Other reaction(s): Other (See Comments)  STOMACH ISSUES AND DIARRHEA AND SOMETIMES VOMITING     No family history on file.  Social History[2]    Review of Systems:  Constitutional:  Denies fever or chills   Eyes:  Denies change in visual acuity   HENT:  Denies nasal congestion or sore throat   Respiratory:  Denies cough or shortness of breath   Cardiovascular:  Denies chest pain or edema   GI:  Denies abdominal pain, nausea, vomiting, bloody stools or diarrhea   :  Denies dysuria   Integument:  Denies rash   Neurologic:  Denies headache, focal weakness or sensory changes   Endocrine:  Denies polyuria or polydipsia   Lymphatic:  Denies swollen glands   Psychiatric:  Denies depression or anxiety     Physical Exam:   Constitutional:  Well developed, well nourished, no acute distress, non-toxic appearance   Integument:  Well hydrated  Neurologic:  Alert & oriented x 3  Psychiatric:  Speech and behavior appropriate     Bilateral Knee Exam    bilateral Knee Exam     Tenderness   The patient is experiencing tenderness in the medial joint line.    Range of Motion   Extension: abnormal   Flexion: abnormal     Muscle  Strength     The patient has normal knee strength.    Tests   Evelyn:  Medial - positive   Lachman:  Anterior - negative      Varus: positive   Valgus: negative  Patellar Apprehension: negative    Other   Erythema: absent  Sensation: normal  Pulse: present  Swelling: moderate          Primary osteoarthritis of left knee  -     Prior authorization Order  -     Large Joint Aspiration/Injection: bilateral knee  -     triamcinolone acetonide injection 40 mg  -     triamcinolone acetonide injection 40 mg    Primary osteoarthritis of right knee  -     Large Joint Aspiration/Injection: bilateral knee  -     triamcinolone acetonide injection 40 mg  -     triamcinolone acetonide injection 40 mg    Recurrent left knee instability    Recurrent right knee instability    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   Needs double upright medial knee   custom braces.   Continue iovera as needed.  Using an aseptic technique, I injected 5 cc of lidocaine 1% without and 1 cc of kenalog 40mg into the bilateral Knee. The patient tolerated this well. I will have them return to clinic in 1 month for left knee monovisc gel injection.            [1]   Current Outpatient Medications on File Prior to Visit   Medication Sig Dispense Refill    acetaZOLAMIDE (DIAMOX) 250 MG tablet Take 1 tablet by mouth 2 (two) times daily.      acetaZOLAMIDE (DIAMOX) 500 mg CpSR Take 1,000 mg by mouth.      albuterol (PROVENTIL/VENTOLIN HFA) 90 mcg/actuation inhaler       bacitracin 500 unit/gram Oint Apply topically 2 (two) times daily. 113 g 0    baclofen (LIORESAL) 10 MG tablet Take 10 mg by mouth 2 (two) times daily.      cholecalciferol, vitamin D3, 1,250 mcg (50,000 unit) capsule Take 50,000 Units by mouth every 7 days.      cyanocobalamin 1,000 mcg/mL injection Inject into the muscle.      diclofenac (VOLTAREN) 75 MG EC  tablet Take 75 mg by mouth 2 (two) times daily.      diclofenac sodium (VOLTAREN) 1 % Gel Apply topically.      doxycycline (VIBRAMYCIN) 100 MG Cap Take 100 mg by mouth 2 (two) times daily.      ferrous gluconate (FERGON) 324 MG tablet Take 1 tablet by mouth.      ferrous sulfate 324 mg (65 mg iron) TbEC Take 324 mg by mouth.      folic acid (FOLVITE) 1 MG tablet Take 1,000 mcg by mouth once daily.      furosemide (LASIX) 80 MG tablet Take 80 mg by mouth.      HYDROcodone-acetaminophen (NORCO)  mg per tablet Take 1 tablet by mouth 4 (four) times daily as needed.      JARDIANCE 10 mg tablet Take 10 mg by mouth.      ketoconazole (NIZORAL) 2 % cream Apply topically as needed.      levocetirizine (XYZAL) 5 MG tablet Take 5 mg by mouth.      losartan (COZAAR) 50 MG tablet Take 50 mg by mouth.      methocarbamoL (ROBAXIN) 500 MG Tab       methylPREDNISolone (MEDROL DOSEPACK) 4 mg tablet use as directed 21 each 0    metOLazone (ZAROXOLYN) 2.5 MG tablet       metoprolol succinate (TOPROL-XL) 25 MG 24 hr tablet Take 25 mg by mouth 2 (two) times daily.      montelukast (SINGULAIR) 10 mg tablet Take 10 mg by mouth once daily.      pantoprazole (PROTONIX) 40 MG tablet Take 40 mg by mouth 2 (two) times daily.      potassium chloride (KLOR-CON) 10 MEQ TbSR Take 10 mEq by mouth once daily.      pramipexole (MIRAPEX) 0.5 MG tablet Take 0.5 mg by mouth.      promethazine (PHENERGAN) 25 MG tablet Take 25 mg by mouth every 6 (six) hours as needed.      SUNOSI 150 mg Tab Take 1 tablet by mouth every morning.      SYMBICORT 160-4.5 mcg/actuation HFAA Inhale into the lungs.       No current facility-administered medications on file prior to visit.   [2]   Social History  Socioeconomic History    Marital status:    Tobacco Use    Smoking status: Never    Smokeless tobacco: Never   Substance and Sexual Activity    Alcohol use: Never    Drug use: Never     Social Drivers of Health     Financial Resource Strain: Medium Risk  (7/19/2025)    Overall Financial Resource Strain (CARDIA)     Difficulty of Paying Living Expenses: Somewhat hard   Food Insecurity: Food Insecurity Present (7/19/2025)    Hunger Vital Sign     Worried About Running Out of Food in the Last Year: Never true     Ran Out of Food in the Last Year: Sometimes true   Transportation Needs: No Transportation Needs (7/19/2025)    PRAPARE - Transportation     Lack of Transportation (Medical): No     Lack of Transportation (Non-Medical): No   Physical Activity: Inactive (7/19/2025)    Exercise Vital Sign     Days of Exercise per Week: 0 days     Minutes of Exercise per Session: 0 min   Stress: Stress Concern Present (7/19/2025)    [employer] [place] [employer] - Occupational Stress Questionnaire     Feeling of Stress : To some extent   Housing Stability: Unknown (7/19/2025)    Housing Stability Vital Sign     Unable to Pay for Housing in the Last Year: Patient declined     Number of Times Moved in the Last Year: 0     Homeless in the Last Year: No

## 2025-07-22 NOTE — PROCEDURES
Large Joint Aspiration/Injection: bilateral knee    Date/Time: 7/22/2025 3:20 PM    Performed by: Mia Escobar FNP  Authorized by: Mia Escobar FNP    Consent Done?:  Yes (Verbal)  Indications:  Pain  Timeout: prior to procedure the correct patient, procedure, and site was verified    Prep: patient was prepped and draped in usual sterile fashion    Local anesthetic:  Lidocaine 1% without epinephrine  Anesthetic total (ml):  5      Details:  Needle Size:  21 G  Approach:  Anterolateral  Location:  Knee  Laterality:  Bilateral  Site:  Bilateral knee  Medications (Right):  40 mg triamcinolone acetonide 40 mg/mL  Medications (Left):  40 mg triamcinolone acetonide 40 mg/mL  Patient tolerance:  Patient tolerated the procedure well with no immediate complications

## 2025-08-06 DIAGNOSIS — M17.11 PRIMARY OSTEOARTHRITIS OF RIGHT KNEE: ICD-10-CM

## 2025-08-06 DIAGNOSIS — M17.12 PRIMARY OSTEOARTHRITIS OF LEFT KNEE: Primary | ICD-10-CM

## 2025-08-22 ENCOUNTER — OFFICE VISIT (OUTPATIENT)
Dept: ORTHOPEDICS | Facility: CLINIC | Age: 50
End: 2025-08-22
Payer: COMMERCIAL

## 2025-08-22 VITALS
WEIGHT: 293 LBS | HEART RATE: 78 BPM | SYSTOLIC BLOOD PRESSURE: 130 MMHG | HEIGHT: 66 IN | BODY MASS INDEX: 47.09 KG/M2 | DIASTOLIC BLOOD PRESSURE: 59 MMHG

## 2025-08-22 DIAGNOSIS — M17.12 PRIMARY OSTEOARTHRITIS OF LEFT KNEE: ICD-10-CM

## 2025-08-22 DIAGNOSIS — M17.0 BILATERAL PRIMARY OSTEOARTHRITIS OF KNEE: Primary | ICD-10-CM

## 2025-08-22 PROCEDURE — 99999 PR PBB SHADOW E&M-EST. PATIENT-LVL V: CPT | Mod: PBBFAC,,, | Performed by: NURSE PRACTITIONER

## 2025-08-22 PROCEDURE — 99499 UNLISTED E&M SERVICE: CPT | Mod: S$GLB,,, | Performed by: NURSE PRACTITIONER

## 2025-08-22 PROCEDURE — 20610 DRAIN/INJ JOINT/BURSA W/O US: CPT | Mod: LT,S$GLB,, | Performed by: NURSE PRACTITIONER

## 2025-08-29 ENCOUNTER — OFFICE VISIT (OUTPATIENT)
Dept: ORTHOPEDICS | Facility: CLINIC | Age: 50
End: 2025-08-29
Payer: COMMERCIAL

## 2025-08-29 VITALS
DIASTOLIC BLOOD PRESSURE: 59 MMHG | HEIGHT: 66 IN | HEART RATE: 78 BPM | WEIGHT: 293 LBS | SYSTOLIC BLOOD PRESSURE: 130 MMHG | BODY MASS INDEX: 47.09 KG/M2

## 2025-08-29 DIAGNOSIS — M17.12 PRIMARY OSTEOARTHRITIS OF LEFT KNEE: Primary | ICD-10-CM

## 2025-08-29 PROCEDURE — 99999 PR PBB SHADOW E&M-EST. PATIENT-LVL V: CPT | Mod: PBBFAC,,, | Performed by: NURSE PRACTITIONER

## 2025-09-05 ENCOUNTER — OFFICE VISIT (OUTPATIENT)
Dept: ORTHOPEDICS | Facility: CLINIC | Age: 50
End: 2025-09-05
Payer: COMMERCIAL

## 2025-09-05 VITALS
WEIGHT: 293 LBS | SYSTOLIC BLOOD PRESSURE: 130 MMHG | BODY MASS INDEX: 47.09 KG/M2 | HEART RATE: 78 BPM | HEIGHT: 66 IN | DIASTOLIC BLOOD PRESSURE: 59 MMHG

## 2025-09-05 DIAGNOSIS — M17.12 PRIMARY OSTEOARTHRITIS OF LEFT KNEE: Primary | ICD-10-CM

## 2025-09-05 PROCEDURE — 99999 PR PBB SHADOW E&M-EST. PATIENT-LVL III: CPT | Mod: PBBFAC,,, | Performed by: NURSE PRACTITIONER
